# Patient Record
Sex: MALE | Race: WHITE | NOT HISPANIC OR LATINO | Employment: FULL TIME | URBAN - METROPOLITAN AREA
[De-identification: names, ages, dates, MRNs, and addresses within clinical notes are randomized per-mention and may not be internally consistent; named-entity substitution may affect disease eponyms.]

---

## 2025-05-23 ENCOUNTER — APPOINTMENT (OUTPATIENT)
Dept: PRIMARY CARE | Facility: CLINIC | Age: 40
End: 2025-05-23

## 2025-05-23 VITALS
HEART RATE: 83 BPM | TEMPERATURE: 97.8 F | HEIGHT: 72 IN | RESPIRATION RATE: 16 BRPM | DIASTOLIC BLOOD PRESSURE: 82 MMHG | SYSTOLIC BLOOD PRESSURE: 125 MMHG | OXYGEN SATURATION: 100 % | WEIGHT: 192.6 LBS | BODY MASS INDEX: 26.09 KG/M2

## 2025-05-23 DIAGNOSIS — L70.9 ACNE, UNSPECIFIED ACNE TYPE: ICD-10-CM

## 2025-05-23 DIAGNOSIS — Z00.00 HEALTHCARE MAINTENANCE: Primary | ICD-10-CM

## 2025-05-23 DIAGNOSIS — I34.0 MITRAL VALVE INSUFFICIENCY, UNSPECIFIED ETIOLOGY: ICD-10-CM

## 2025-05-23 DIAGNOSIS — N52.9 ERECTILE DYSFUNCTION, UNSPECIFIED ERECTILE DYSFUNCTION TYPE: ICD-10-CM

## 2025-05-23 DIAGNOSIS — L72.3 SEBACEOUS CYST: ICD-10-CM

## 2025-05-23 DIAGNOSIS — R53.83 OTHER FATIGUE: ICD-10-CM

## 2025-05-23 PROBLEM — I34.1 MITRAL VALVE PROLAPSE: Status: ACTIVE | Noted: 2025-05-23

## 2025-05-23 PROBLEM — G47.33 OSA (OBSTRUCTIVE SLEEP APNEA): Status: ACTIVE | Noted: 2025-05-23

## 2025-05-23 PROCEDURE — 1036F TOBACCO NON-USER: CPT | Performed by: FAMILY MEDICINE

## 2025-05-23 PROCEDURE — 99396 PREV VISIT EST AGE 40-64: CPT | Performed by: FAMILY MEDICINE

## 2025-05-23 PROCEDURE — 3008F BODY MASS INDEX DOCD: CPT | Performed by: FAMILY MEDICINE

## 2025-05-23 RX ORDER — TADALAFIL 10 MG/1
10 TABLET ORAL DAILY PRN
Qty: 12 TABLET | Refills: 3 | Status: SHIPPED | OUTPATIENT
Start: 2025-05-23 | End: 2026-05-23

## 2025-05-23 ASSESSMENT — ENCOUNTER SYMPTOMS
POLYDIPSIA: 0
CONFUSION: 0
FEVER: 0
HEMATURIA: 0
JOINT SWELLING: 0
COUGH: 0
VOMITING: 0
MYALGIAS: 0
SORE THROAT: 0
HALLUCINATIONS: 0
NAUSEA: 0
CONSTIPATION: 0
CHILLS: 0
FATIGUE: 1
PALPITATIONS: 0
DIFFICULTY URINATING: 0
EYE DISCHARGE: 0
ADENOPATHY: 0
DIARRHEA: 0
SINUS PRESSURE: 0
AGITATION: 0
ABDOMINAL PAIN: 0
DIZZINESS: 0
WEAKNESS: 0
BRUISES/BLEEDS EASILY: 0
ABDOMINAL DISTENTION: 0
SHORTNESS OF BREATH: 0
SEIZURES: 0

## 2025-05-23 ASSESSMENT — PATIENT HEALTH QUESTIONNAIRE - PHQ9
SUM OF ALL RESPONSES TO PHQ9 QUESTIONS 1 AND 2: 0
2. FEELING DOWN, DEPRESSED OR HOPELESS: NOT AT ALL
1. LITTLE INTEREST OR PLEASURE IN DOING THINGS: NOT AT ALL

## 2025-05-23 NOTE — ASSESSMENT & PLAN NOTE
Orders:    tadalafil (Cialis) 10 mg tablet; Take 1 tablet (10 mg) by mouth once daily as needed for erectile dysfunction.    Testosterone, total and free; Future

## 2025-05-23 NOTE — PROGRESS NOTES
Subjective   Patient ID: Royer Acosta is a 39 y.o. male who presents for Establish Care and Annual Exam.  Well Adult Physical   Patient here for a comprehensive physical exam.The patient reports problems -patient was seen by cardiology at the [facility] about 2-1/2 years ago and was advised to follow-up with another cardiology regarding a procedure for his mitral valve prolapse however he did not follow-up with that physician and has been lost to follow-up since.  He denies any chest pain palpitations syncope or exertional fatigue.      Patient does have longstanding erectile dysfunction he has had some medication either Cialis or Viagra in the past which seem to help he would like to try this again.  However he also endorses decreased libido and decreased energy overall.    Patient also has noticed significant acne on his back sometimes this is very uncomfortable when he is wearing his vest for work.  He also has an enlarging cyst on his back.  This cyst is not particularly painful or sore right now    Diet: Well-balanced    Exercise: m lifting weights once a week for 20 to 30 minutes    Social History    Tobacco Use      Smoking status: Former        Packs/day: 0.00        Types: Cigarettes        Quit date: 2010        Years since quitting: 15.4      Smokeless tobacco: Never    Alcohol use: Not Currently    Drug use: Never        There is no immunization history on file for this patient.                 Review of Systems   Constitutional:  Positive for fatigue. Negative for chills and fever.   HENT:  Negative for ear pain, sinus pressure and sore throat.    Eyes:  Negative for discharge and visual disturbance.   Respiratory:  Negative for cough and shortness of breath.    Cardiovascular:  Negative for chest pain and palpitations.   Gastrointestinal:  Negative for abdominal distention, abdominal pain, constipation, diarrhea, nausea and vomiting.   Endocrine: Negative for cold intolerance, heat  intolerance, polydipsia and polyuria.   Genitourinary:  Negative for difficulty urinating, hematuria and urgency.   Musculoskeletal:  Negative for joint swelling and myalgias.   Skin:  Negative for rash.   Allergic/Immunologic: Negative for environmental allergies and immunocompromised state.   Neurological:  Negative for dizziness, seizures and weakness.   Hematological:  Negative for adenopathy. Does not bruise/bleed easily.   Psychiatric/Behavioral:  Negative for agitation, confusion and hallucinations.        Objective   Physical Exam  Constitutional:       Appearance: Normal appearance.   HENT:      Head: Normocephalic and atraumatic.      Right Ear: Tympanic membrane normal.      Left Ear: Tympanic membrane normal.      Nose: Nose normal.      Mouth/Throat:      Mouth: Mucous membranes are moist.      Pharynx: Oropharynx is clear.   Cardiovascular:      Rate and Rhythm: Normal rate and regular rhythm.      Heart sounds: Murmur (3 out of 6 systolic murmur) heard.   Pulmonary:      Effort: Pulmonary effort is normal.      Breath sounds: Normal breath sounds.   Abdominal:      General: Abdomen is flat. Bowel sounds are normal.      Palpations: Abdomen is soft.   Skin:     General: Skin is warm and dry.      Comments: Mild to moderate cystic acne over lower face and upper back with scarring.  Sebaceous cyst not inflamed over left mid back approximately 2 cm in diameter.     Neurological:      Mental Status: He is alert.   Psychiatric:         Mood and Affect: Mood normal.         Behavior: Behavior normal.         Assessment & Plan  Healthcare maintenance    Orders:    Lipid Panel; Future    TSH; Future    HIV 1/2 Antigen/Antibody Screen with Reflex to Confirmation; Future    Hepatitis C antibody; Future    CBC and Auto Differential; Future    Comprehensive Metabolic Panel; Future    Other fatigue    Orders:    Referral to General Surgery; Future    TSH; Future    CBC and Auto Differential; Future    Comprehensive  Metabolic Panel; Future    Erectile dysfunction, unspecified erectile dysfunction type    Orders:    tadalafil (Cialis) 10 mg tablet; Take 1 tablet (10 mg) by mouth once daily as needed for erectile dysfunction.    Testosterone, total and free; Future    Acne, unspecified acne type         Sebaceous cyst    Orders:    Referral to General Surgery; Future

## 2025-05-25 LAB
ALBUMIN SERPL-MCNC: 4.6 G/DL (ref 3.6–5.1)
ALP SERPL-CCNC: 61 U/L (ref 36–130)
ALT SERPL-CCNC: 22 U/L (ref 9–46)
ANION GAP SERPL CALCULATED.4IONS-SCNC: 8 MMOL/L (CALC) (ref 7–17)
AST SERPL-CCNC: 16 U/L (ref 10–40)
BASOPHILS # BLD AUTO: 71 CELLS/UL (ref 0–200)
BASOPHILS NFR BLD AUTO: 1 %
BILIRUB SERPL-MCNC: 0.8 MG/DL (ref 0.2–1.2)
BUN SERPL-MCNC: 15 MG/DL (ref 7–25)
CALCIUM SERPL-MCNC: 9.6 MG/DL (ref 8.6–10.3)
CHLORIDE SERPL-SCNC: 100 MMOL/L (ref 98–110)
CHOLEST SERPL-MCNC: 231 MG/DL
CHOLEST/HDLC SERPL: 5.8 (CALC)
CO2 SERPL-SCNC: 30 MMOL/L (ref 20–32)
CREAT SERPL-MCNC: 1.08 MG/DL (ref 0.6–1.29)
EGFRCR SERPLBLD CKD-EPI 2021: 88 ML/MIN/1.73M2
EOSINOPHIL # BLD AUTO: 298 CELLS/UL (ref 15–500)
EOSINOPHIL NFR BLD AUTO: 4.2 %
ERYTHROCYTE [DISTWIDTH] IN BLOOD BY AUTOMATED COUNT: 13 % (ref 11–15)
GLUCOSE SERPL-MCNC: 92 MG/DL (ref 65–99)
HCT VFR BLD AUTO: 46.7 % (ref 38.5–50)
HCV AB SERPL QL IA: NORMAL
HDLC SERPL-MCNC: 40 MG/DL
HGB BLD-MCNC: 15.4 G/DL (ref 13.2–17.1)
HIV 1+2 AB+HIV1 P24 AG SERPL QL IA: NORMAL
LDLC SERPL CALC-MCNC: 143 MG/DL (CALC)
LYMPHOCYTES # BLD AUTO: 1960 CELLS/UL (ref 850–3900)
LYMPHOCYTES NFR BLD AUTO: 27.6 %
MCH RBC QN AUTO: 29 PG (ref 27–33)
MCHC RBC AUTO-ENTMCNC: 33 G/DL (ref 32–36)
MCV RBC AUTO: 87.9 FL (ref 80–100)
MONOCYTES # BLD AUTO: 433 CELLS/UL (ref 200–950)
MONOCYTES NFR BLD AUTO: 6.1 %
NEUTROPHILS # BLD AUTO: 4338 CELLS/UL (ref 1500–7800)
NEUTROPHILS NFR BLD AUTO: 61.1 %
NONHDLC SERPL-MCNC: 191 MG/DL (CALC)
PLATELET # BLD AUTO: 215 THOUSAND/UL (ref 140–400)
PMV BLD REES-ECKER: 10.7 FL (ref 7.5–12.5)
POTASSIUM SERPL-SCNC: 4.5 MMOL/L (ref 3.5–5.3)
PROT SERPL-MCNC: 7.1 G/DL (ref 6.1–8.1)
RBC # BLD AUTO: 5.31 MILLION/UL (ref 4.2–5.8)
SODIUM SERPL-SCNC: 138 MMOL/L (ref 135–146)
TESTOST FREE SERPL-MCNC: NORMAL PG/ML
TESTOST SERPL-MCNC: NORMAL NG/DL
TRIGL SERPL-MCNC: 290 MG/DL
TSH SERPL-ACNC: 1.16 MIU/L (ref 0.4–4.5)
WBC # BLD AUTO: 7.1 THOUSAND/UL (ref 3.8–10.8)

## 2025-05-26 ENCOUNTER — PATIENT MESSAGE (OUTPATIENT)
Dept: PRIMARY CARE | Facility: CLINIC | Age: 40
End: 2025-05-26
Payer: COMMERCIAL

## 2025-05-28 ENCOUNTER — TELEPHONE (OUTPATIENT)
Dept: PRIMARY CARE | Facility: CLINIC | Age: 40
End: 2025-05-28

## 2025-05-28 ENCOUNTER — TELEPHONE (OUTPATIENT)
Dept: PRIMARY CARE | Facility: CLINIC | Age: 40
End: 2025-05-28
Payer: COMMERCIAL

## 2025-05-28 NOTE — TELEPHONE ENCOUNTER
Patient called, needs order for Echo, faxed to [facility], [facility], fax#[phone], he can have the test done tomorrow at 11:00, needs to be stat.

## 2025-05-28 NOTE — TELEPHONE ENCOUNTER
Patient called to schedule an appointment for a ECHO, in order to have it done in the time frame your requesting, the order needs to be changed to stat, for the insurance to cover it.

## 2025-05-29 LAB
ALBUMIN SERPL-MCNC: 4.6 G/DL (ref 3.6–5.1)
ALP SERPL-CCNC: 61 U/L (ref 36–130)
ALT SERPL-CCNC: 22 U/L (ref 9–46)
ANION GAP SERPL CALCULATED.4IONS-SCNC: 8 MMOL/L (CALC) (ref 7–17)
AST SERPL-CCNC: 16 U/L (ref 10–40)
BASOPHILS # BLD AUTO: 71 CELLS/UL (ref 0–200)
BASOPHILS NFR BLD AUTO: 1 %
BILIRUB SERPL-MCNC: 0.8 MG/DL (ref 0.2–1.2)
BUN SERPL-MCNC: 15 MG/DL (ref 7–25)
CALCIUM SERPL-MCNC: 9.6 MG/DL (ref 8.6–10.3)
CHLORIDE SERPL-SCNC: 100 MMOL/L (ref 98–110)
CHOLEST SERPL-MCNC: 231 MG/DL
CHOLEST/HDLC SERPL: 5.8 (CALC)
CO2 SERPL-SCNC: 30 MMOL/L (ref 20–32)
CREAT SERPL-MCNC: 1.08 MG/DL (ref 0.6–1.29)
EGFRCR SERPLBLD CKD-EPI 2021: 88 ML/MIN/1.73M2
EOSINOPHIL # BLD AUTO: 298 CELLS/UL (ref 15–500)
EOSINOPHIL NFR BLD AUTO: 4.2 %
ERYTHROCYTE [DISTWIDTH] IN BLOOD BY AUTOMATED COUNT: 13 % (ref 11–15)
GLUCOSE SERPL-MCNC: 92 MG/DL (ref 65–99)
HCT VFR BLD AUTO: 46.7 % (ref 38.5–50)
HCV AB SERPL QL IA: NORMAL
HDLC SERPL-MCNC: 40 MG/DL
HGB BLD-MCNC: 15.4 G/DL (ref 13.2–17.1)
HIV 1+2 AB+HIV1 P24 AG SERPL QL IA: NORMAL
LDLC SERPL CALC-MCNC: 143 MG/DL (CALC)
LYMPHOCYTES # BLD AUTO: 1960 CELLS/UL (ref 850–3900)
LYMPHOCYTES NFR BLD AUTO: 27.6 %
MCH RBC QN AUTO: 29 PG (ref 27–33)
MCHC RBC AUTO-ENTMCNC: 33 G/DL (ref 32–36)
MCV RBC AUTO: 87.9 FL (ref 80–100)
MONOCYTES # BLD AUTO: 433 CELLS/UL (ref 200–950)
MONOCYTES NFR BLD AUTO: 6.1 %
NEUTROPHILS # BLD AUTO: 4338 CELLS/UL (ref 1500–7800)
NEUTROPHILS NFR BLD AUTO: 61.1 %
NONHDLC SERPL-MCNC: 191 MG/DL (CALC)
PLATELET # BLD AUTO: 215 THOUSAND/UL (ref 140–400)
PMV BLD REES-ECKER: 10.7 FL (ref 7.5–12.5)
POTASSIUM SERPL-SCNC: 4.5 MMOL/L (ref 3.5–5.3)
PROT SERPL-MCNC: 7.1 G/DL (ref 6.1–8.1)
RBC # BLD AUTO: 5.31 MILLION/UL (ref 4.2–5.8)
SODIUM SERPL-SCNC: 138 MMOL/L (ref 135–146)
TESTOST FREE SERPL-MCNC: 67.7 PG/ML (ref 35–155)
TESTOST SERPL-MCNC: 358 NG/DL (ref 250–1100)
TRIGL SERPL-MCNC: 290 MG/DL
TSH SERPL-ACNC: 1.16 MIU/L (ref 0.4–4.5)
WBC # BLD AUTO: 7.1 THOUSAND/UL (ref 3.8–10.8)

## 2025-05-30 ENCOUNTER — APPOINTMENT (OUTPATIENT)
Dept: CARDIOLOGY | Facility: HOSPITAL | Age: 40
End: 2025-05-30
Payer: COMMERCIAL

## 2025-06-03 ENCOUNTER — APPOINTMENT (OUTPATIENT)
Dept: CARDIOLOGY | Facility: CLINIC | Age: 40
End: 2025-06-03
Payer: COMMERCIAL

## 2025-06-03 ENCOUNTER — OFFICE VISIT (OUTPATIENT)
Dept: CARDIOLOGY | Facility: CLINIC | Age: 40
End: 2025-06-03
Payer: COMMERCIAL

## 2025-06-03 ENCOUNTER — TELEPHONE (OUTPATIENT)
Dept: CARDIOLOGY | Facility: CLINIC | Age: 40
End: 2025-06-03

## 2025-06-03 VITALS
SYSTOLIC BLOOD PRESSURE: 114 MMHG | HEIGHT: 72 IN | WEIGHT: 194 LBS | HEART RATE: 76 BPM | BODY MASS INDEX: 26.28 KG/M2 | DIASTOLIC BLOOD PRESSURE: 54 MMHG

## 2025-06-03 DIAGNOSIS — R06.02 SHORTNESS OF BREATH: ICD-10-CM

## 2025-06-03 DIAGNOSIS — R00.2 PALPITATIONS: ICD-10-CM

## 2025-06-03 DIAGNOSIS — I34.0 MITRAL VALVE INSUFFICIENCY, UNSPECIFIED ETIOLOGY: Primary | ICD-10-CM

## 2025-06-03 DIAGNOSIS — E78.5 HYPERLIPIDEMIA, UNSPECIFIED HYPERLIPIDEMIA TYPE: ICD-10-CM

## 2025-06-03 PROCEDURE — 3008F BODY MASS INDEX DOCD: CPT | Performed by: INTERNAL MEDICINE

## 2025-06-03 PROCEDURE — 99205 OFFICE O/P NEW HI 60 MIN: CPT | Performed by: INTERNAL MEDICINE

## 2025-06-03 PROCEDURE — 93000 ELECTROCARDIOGRAM COMPLETE: CPT | Performed by: INTERNAL MEDICINE

## 2025-06-03 NOTE — PROGRESS NOTES
Patient:  Royer Acosta  YOB: 1985  MRN: [id number]       Impression/Plan:     Diagnoses and all orders for this visit:  Mitral valve insufficiency, unspecified etiology  -    Exam and echo are both consistent with severe mitral regurgitation although he has an excellent functional capacity able to run 1.5 mile.  Stress test would be very important to assure his functional capacity is indeed normal for age and that there are no inducible dysrhythmia associated.  -     Coronary angiography was said to be normal will obtain images to assess also obtain images of echo to assure LV systolic function truly normal there is suggestion of mild LV dilatation.  Given his functional capacity no indication for urgent intervention on the mitral valve.  Further recommendations based on results of stress testing also personal review of images when available.  -     The murmur sounds more like mitral regurgitation from valve dysfunction as opposed to valve prolapse hence would like to further evaluate the images  -     Stress Test; Future  Shortness of breath  -     Stress test to determine if significant exertional hypertension, dysrhythmia contributing.  He is known to have normal coronary arteries monitoring to determine if any dysrhythmia contributing  -     Stress Test; Future  -     Holter or Event Cardiac Monitor; Future  Palpitations  -    Mitral valve prolapse is associated with supraventricular and ventricular dysrhythmia monitoring to determine etiology of his symptoms  -     Holter or Event Cardiac Monitor; Future  Hyperlipidemia, unspecified hyperlipidemia type  -    LDL in excess of 140 he has been changing his diet quite a bit would consider statin therapy if after 3 to 6 months of optimizing diet LDL remains excessive      Chief Complaint/Active Symptoms:      Chief Complaint   Patient presents with    New Patient Visit     Presents today for NPV       Royer Acosta is a 40 y.o. male who presents  with moderate-severe MR at [facility] 2022 at that time also noted to have normal LV systolic function and normal coronary arteries.    He is an active duty  in [place].  Previously having served 20 years in [employer].  He is in need of stress testing to meet physical requirements for the [employer] and seeks cardiology opinion as to he is degree of mitral regurgitation.  As noted below on testing at [facility]  coronary arteries are angiographically normal there is a PFO and severe MR secondary to posterior leaflet flail.  At that time LV function was felt to be normal and no intervention was recommended at that point in time.    He remains quite active though he is more tired than he used to be.  He does not have shortness of breath.  Indeed last week he ran 1.5 mile without difficulty.  He has had no PND or orthopnea.  Occasionally he will feel a skipped heartbeat but no exertional tachycardia is appreciated.  He has had no dizziness, lightheadedness or syncope.    There is a family history of mitral valve prolapse in his sister.  No known history of premature coronary disease though his father  at a relatively young age from suicide.    Lab work 2025 CMP, CBC, hepatitis antibody, TSH normal cholesterol 231 HDL 40 triglycerides 290     10/11/2022 [facility]  Normal aorta  Normal aortic trileaflet valve  Large atrial septal aneurysm  PFO  Left to R shunt across PFO indicating high left atrial appendage pressures from MR  Severe MR, coanda Jet hitting the lateral anterior wall of Left atrial chamber  Posterior mitral leaflet with flail scallop  not clear if P2 or P3  Normal Left ventricle  Normal tricuspid valve  Normal pulmonic valve  Normal pulmonary artery  Coronary sinus appeared somewhat dilated  Normal IVC  No thrombus in appendage  Agitated saline jet no bubble crossign R to L due to L to R flow and high left atrial pressures    10/12/2022 right/left  heart catheterization and coronary angiography [facility]  HEMODYNAMICS:       Right Atrium: 1 mm hg       Right Ventricle: 28/3       Pulmonary Artery: 20/3/11       PCWP: 6 mm hg   Normal 02 sat step up no evidence of left-to-right shunting.  Coronary/LV        1.    Mitral valve regurgitation  2.    Normal coronary arteries  3.    Normal LV systolic function         2025 echocardiogram [facility]  - The left ventricle is mildly dilated. There is moderate eccentric left   ventricular hypertrophy. Left ventricular systolic function is normal. EF = 56 ±   5% (2D biplane) Left ventricular diastolic function was not evaluated due to >2+   MR.   - The right ventricle is normal in size. Right ventricular systolic function is   normal. Estimated right ventricular systolic pressure is not reported due to an   insufficient tricuspid regurgitation signal.   - The left atrial cavity is severely dilated. PFO seen on [facility] 10/11/2022. The   interatrial septum is aneurysmal.   - There is severe (4+) mitral valve regurgitation due to prolapse of the posterior    mitral leaflet. There appears to be a distal flail segment in some views (clips   #[id number], 55).   - Exam was compared with the prior  echocardiographic exam performed on   10/11/2022, there are no significant changes.     EC/3/25 NSR Normal record    Past medical history   Mitral regurgitation secondary to prolapse     Erectile dysfunction  Acne  Sleep apnea on CPAP    Surgical History[1]  Coronary angiography    Family History  Problem Relation Name Age of Onset    Drug abuse Mother  61    Alcohol abuse Father      COPD Father      Heart disease Father      Mitral valve prolapse Sister      Suicide Attempts Maternal Grandfather         Social hx  Stop smoking cigarettes , alcohol rare, drug abuse none      Review of Systems: Unremarkable except as noted above    Meds     Current Outpatient Medications   Medication Instructions     tadalafil (CIALIS) 10 mg, oral, Daily PRN        Allergies   Allergies[2]      Annotated Problems     Specialty Problems          Cardiology Problems    Mitral valve regurgitation    Diagnosed by the [facility] in 2022, patient lost to follow-up             Problem List     Patient Active Problem List    Diagnosis Date Noted    KATHRYN (obstructive sleep apnea) 05/23/2025    Sebaceous cyst 05/23/2025    Erectile dysfunction 05/23/2025    Acne 05/23/2025    Mitral valve regurgitation 05/23/2025       Objective:     Vitals:    06/03/25 0918   BP: 114/54   BP Location: Left arm   Patient Position: Sitting   Pulse: 76   Weight: 88 kg (194 lb)   Height: 1.829 m (6')      Wt Readings from Last 4 Encounters:   06/03/25 88 kg (194 lb)   05/23/25 87.4 kg (192 lb 9.6 oz)           LAB:     Lab Results   Component Value Date    WBC 7.1 05/24/2025    HGB 15.4 05/24/2025    HCT 46.7 05/24/2025     05/24/2025    CHOL 231 (H) 05/24/2025    TRIG 290 (H) 05/24/2025    HDL 40 05/24/2025    ALT 22 05/24/2025    AST 16 05/24/2025     05/24/2025    K 4.5 05/24/2025     05/24/2025    CREATININE 1.08 05/24/2025    BUN 15 05/24/2025    CO2 30 05/24/2025    TSH 1.16 05/24/2025         Physical Exam     General Appearance: alert and oriented to person, place and time, in no acute distress  Cardiovascular: normal rate, regular rhythm, normal S1 and S2, 3/6 mitral regurgitation murmur no click appreciated, no rubs, clicks, or gallops,  no JVD  Pulmonary/Chest: clear to auscultation bilaterally- no wheezes, rales or rhonchi, normal air movement, no respiratory distress  Abdomen: soft, non-tender, non-distended, normal bowel sounds, no masses   Extremities: no cyanosis, clubbing or edema  Skin: warm and dry, no rash or erythema  Eyes: EOMI  Neck: supple and non-tender without mass, no thyromegaly   Neurological: alert, oriented, normal speech, no focal findings or movement disorder noted  Vascular: Pulses 2+ no  villa    Scribe Attestation  By signing my name below, I, Senait Dalton CMA   , Scribe   attest that this documentation has been prepared under the direction and in the presence of Zeeshan Mercedes MD.    Provider attestation-scribe documentation  Any medical record entries made by the scribe were at my discretion and personally dictated by me.  I have reviewed the chart and agree that the record accurately reflects my personal performance of the history, physical exam, discussion and plan.                 [1]   Past Surgical History:  Procedure Laterality Date    CARDIAC CATHETERIZATION  10/2022   [2]   Allergies  Allergen Reactions    Tetracyclines Itching    Ragweed Pollen Itching

## 2025-06-03 NOTE — PATIENT INSTRUCTIONS
IN PREPARATION FOR YOUR MONITOR APPOINTMENT BATHE OR SHOWER PRIOR TO YOUR APPOINTMENT.  DO NOT APPLY ANY THING TO YOUR SKIN-  NO LOTION, CREAMS, OILS, POWDERS, PERFUMES, COLOGNES OR SPRAY FRAGRANCES.      HAVE REGULAR DENTAL CHECK UPS TO AVOID INFECTIONS THAT CAN GO TO YOUR HEART VALVE     Please bring all medicines, vitamins, and herbal supplements with you in original bottles to every appointment! This is the best way to ensure your medication list in your chart is accurate.    Prescriptions will not be filled unless you are compliant with your follow up appointments or have a follow up appointment scheduled as per instruction of your physician. Refills should be requested at the time of your visit.    DID YOU KNOW  We have a pharmacy here in the [facility].  They can fill all prescriptions, not just cardiac medications.  Prescriptions from other pharmacies can easily be transferred to the  pharmacy by the  pharmacist on site.   pharmacies offer FREE HOME DELIVERY on medications to anywhere in [place]. They can sync your medications. Typically prescriptions can be ready in 10 - 15 minutes. If pharmacy is unable to fill your  prescription or if cost is more than your paying now the Pharmacist can easily transfer back to your Pharmacy of choice. Pharmacy phone # [phone].

## 2025-06-03 NOTE — TELEPHONE ENCOUNTER
14 day Aaron [id number] does not require auth per Jace DUNLAP at [facility]-call reference#[id number].

## 2025-06-05 ENCOUNTER — APPOINTMENT (OUTPATIENT)
Dept: CARDIOLOGY | Facility: CLINIC | Age: 40
End: 2025-06-05

## 2025-06-12 ENCOUNTER — APPOINTMENT (OUTPATIENT)
Dept: SURGERY | Facility: CLINIC | Age: 40
End: 2025-06-12
Payer: COMMERCIAL

## 2025-06-12 VITALS
BODY MASS INDEX: 26.03 KG/M2 | WEIGHT: 192.2 LBS | RESPIRATION RATE: 16 BRPM | OXYGEN SATURATION: 97 % | HEART RATE: 67 BPM | DIASTOLIC BLOOD PRESSURE: 74 MMHG | TEMPERATURE: 97.5 F | SYSTOLIC BLOOD PRESSURE: 124 MMHG | HEIGHT: 72 IN

## 2025-06-12 DIAGNOSIS — R22.2 MASS OF SUBCUTANEOUS TISSUE OF BACK: Primary | ICD-10-CM

## 2025-06-12 DIAGNOSIS — R53.83 OTHER FATIGUE: ICD-10-CM

## 2025-06-12 DIAGNOSIS — L72.3 SEBACEOUS CYST: ICD-10-CM

## 2025-06-12 PROCEDURE — 1036F TOBACCO NON-USER: CPT | Performed by: SURGERY

## 2025-06-12 PROCEDURE — 3008F BODY MASS INDEX DOCD: CPT | Performed by: SURGERY

## 2025-06-12 PROCEDURE — 99204 OFFICE O/P NEW MOD 45 MIN: CPT | Performed by: SURGERY

## 2025-06-12 NOTE — PROGRESS NOTES
History and Physical        Referring Provider: Karrie Barragan MD       Chief Complaint:  Mass on L lower Back      History of Present Illness:  This is a 40 y.o. male who presents with mass to L lower back   Present for years, not sure when he first noticed it   Thinks that it is getting bigger - now is aware of it in his vest at work which is bothersome  Unsure if it has had drainage  No other similar lesions in past    Past Medical History:  Mitral Valve regurgitation- currently undergoing work up, ED, KATHRYN, Hyperlipidemia, cluster headaches  6/17 - stress testing upcoming, holter monitor     Past Surgical History:  Heart cath 2022    Medications:  As per patient medical records     Allergies:  Tetracyclines, Ragweed      Family History:  The information was reviewed and no pertinent findings are relevant to the presenting problem  Mother passed away when younger, father medical hx unknown - limited family hx     Social History:  Patient works as a  and lives at home with wife.   Son in college   Denies nicotine use, EtOH use, or IDU.    Review of Systems  A complete 10 point review of systems was performed and is negative except as noted in the history of present illness.      Physical Exam: /74 (BP Location: Left arm, Patient Position: Sitting, BP Cuff Size: Adult)   Pulse 67   Temp 36.4 °C (97.5 °F) (Temporal)   Resp 16   Ht 1.829 m (6')   Wt 87.2 kg (192 lb 3.2 oz)   SpO2 97%   BMI 26.07 kg/m²      General: No acute distress.  Neuro: Alert and oriented ×3. Follows commands.  Head: Atraumatic  Eyes: Extraocular motions intact.  Ears: Hears normal speaking voice.  Chest: Nonlabored breathing, bilateral chest rise.  Musculoskeletal: Moves all extremities.  Normal range of motion.  Skin: No rashes, 2.5 cm oval firm mobile mass to L lower back, non painful to palpation, telangectasias present over top.  Psychological: Normal affect        Labs:  Labs from 5/24/25 reviewed and are within  normal limits for the patient.        Imaging: none          Assessment:  This is a 40 y.o. male who presents with lipoma to left lower back. Patient desires removal    We discussed the surgery, recovery, and restrictions afterwards and all questions were answered.     The reasons for, benefits to, and risks of the operation were discussed.  The risks include, but are not limited to, bleeding, infection, recurrence.  The patient appeared to understand and consented for the operation. Anticipated convalescence was reviewed in detail. All questions were answered, and consent was obtained.      Plan:  -- We will plan for excision of L lower back  lipoma. On 7/29, with follow-up on 8/11   -- We will plan for surgery to be performed as an outpatient.  -- We will apply Dermabond, so the patient may shower the day after surgery.  No swimming or tub soaks for 4 weeks post-operatively.      Lanie Mathews DO  Family Medicine - PGY-1    Patient seen, discussed, and examined with resident.   I personally oversaw all the critical portions of the exam.  I reviewed the resident's documentation. All changes have been made within the note to reflect my medical decision making.    Shameka Jordan MD MPH  General Surgery  Office: [phone]  Fax: [phone]

## 2025-06-17 ENCOUNTER — HOSPITAL ENCOUNTER (OUTPATIENT)
Dept: CARDIOLOGY | Facility: CLINIC | Age: 40
Discharge: HOME | End: 2025-06-17
Payer: COMMERCIAL

## 2025-06-17 ENCOUNTER — APPOINTMENT (OUTPATIENT)
Dept: CARDIOLOGY | Facility: CLINIC | Age: 40
End: 2025-06-17
Payer: COMMERCIAL

## 2025-06-17 DIAGNOSIS — I34.0 MITRAL VALVE INSUFFICIENCY, UNSPECIFIED ETIOLOGY: ICD-10-CM

## 2025-06-17 DIAGNOSIS — R00.2 PALPITATIONS: ICD-10-CM

## 2025-06-17 DIAGNOSIS — R06.02 SHORTNESS OF BREATH: ICD-10-CM

## 2025-06-17 PROCEDURE — 93017 CV STRESS TEST TRACING ONLY: CPT

## 2025-06-17 PROCEDURE — 93018 CV STRESS TEST I&R ONLY: CPT | Performed by: INTERNAL MEDICINE

## 2025-06-17 PROCEDURE — 93016 CV STRESS TEST SUPVJ ONLY: CPT | Performed by: INTERNAL MEDICINE

## 2025-07-10 PROCEDURE — 93248 EXT ECG>7D<15D REV&INTERPJ: CPT | Performed by: INTERNAL MEDICINE

## 2025-07-14 ENCOUNTER — APPOINTMENT (OUTPATIENT)
Dept: CARDIOLOGY | Facility: CLINIC | Age: 40
End: 2025-07-14
Payer: COMMERCIAL

## 2025-07-14 VITALS
BODY MASS INDEX: 25.87 KG/M2 | DIASTOLIC BLOOD PRESSURE: 70 MMHG | SYSTOLIC BLOOD PRESSURE: 116 MMHG | HEIGHT: 72 IN | WEIGHT: 191 LBS | HEART RATE: 77 BPM

## 2025-07-14 DIAGNOSIS — I34.1 MITRAL VALVE PROLAPSE: ICD-10-CM

## 2025-07-14 DIAGNOSIS — I47.10 PAROXYSMAL SVT (SUPRAVENTRICULAR TACHYCARDIA): Primary | ICD-10-CM

## 2025-07-14 DIAGNOSIS — I34.0 NONRHEUMATIC MITRAL VALVE REGURGITATION: ICD-10-CM

## 2025-07-14 DIAGNOSIS — E78.00 PURE HYPERCHOLESTEROLEMIA: ICD-10-CM

## 2025-07-14 PROCEDURE — 1036F TOBACCO NON-USER: CPT | Performed by: INTERNAL MEDICINE

## 2025-07-14 PROCEDURE — 99214 OFFICE O/P EST MOD 30 MIN: CPT | Performed by: INTERNAL MEDICINE

## 2025-07-14 PROCEDURE — 3008F BODY MASS INDEX DOCD: CPT | Performed by: INTERNAL MEDICINE

## 2025-07-14 NOTE — PATIENT INSTRUCTIONS
FOLLOW UP WITH Dr. Zeeshan Mercedes MD, [facility] IN 6 MONTHS    FASTING LABS TO BE DONE ONE WEEK PRIOR TO NEXT VISIT    ECHO TO BE SCHEDULED JUST PRIOR TO NEXT APPT     CARDIAC MRI TO BE SCHEDULED IN THE NEAR FUTURE         DID YOU KNOW  We have a pharmacy here in the [facility].  They can fill all prescriptions, not just cardiac medications.  Prescriptions from other pharmacies can easily be transferred to the  pharmacy by the  pharmacist on site.   pharmacies offer FREE HOME DELIVERY on medications to anywhere in [place]. They can sync your medications. Typically prescriptions can be ready in 10 - 15 minutes. If pharmacy is unable to fill your  prescription or if cost is more than your paying now the Pharmacist can easily transfer back to your Pharmacy of choice. Pharmacy phone # [phone].     Please bring all medicines, vitamins, and herbal supplements with you in original bottles to every appointment! This is the best way to ensure your medication list in your chart is accurate.    Prescriptions will not be filled unless you are compliant with your follow up appointments or have a follow up appointment scheduled as per instruction of your physician. Refills should be requested at the time of your visit.

## 2025-07-14 NOTE — PROGRESS NOTES
Patient:  Royer Acosta  YOB: 1985  MRN: [id number]       Impression/Plan:     Diagnoses and all orders for this visit:  Paroxysmal SVT (supraventricular tachycardia)  -     Currently asymptomatic.  -     Should symptoms occur could consider verapamil or beta-blocker.  He would prefer no medication there is no absolute indication at this time.  He was able accomplish a remarkable functional capacity for 40 years old at 16 METS without exertional dysrhythmia.  Continue an expectant approach  Nonrheumatic mitral valve regurgitation  Mitral valve prolapse  -     High level of activity without exertional dyspnea.  LV on echo of May 2025 may be slightly enlarged comparison to 2022 but still within normal range.  Cardiac MRI would be the optimal assessment of LV function to monitor closely over time for any early LV dilatation.  -     At this time there is no indication for intervention on the mitral valve.  He has no shortness of breath able to accomplish 16 METS of activity without dysrhythmia or cardiovascular symptom.  Continue to monitor for symptoms.  -      Plan to repeat echo in 6 months versus repeat MRI depending on findings of MRI.  -     MR cardiac morphology and function w and wo IV contrast; Future  -     Transthoracic Echo Complete; Future  -     Lipid Panel; Future  -     He has no cardiac issues at this time to prevent him from working as a   Hyperlipidemia        -     He will watch his diet more closely and before I see him in 6 months we will obtain a lipid profile        Chief Complaint/Active Symptoms:      Chief Complaint   Patient presents with    Results     Patient presents to the office to discuss recent testing.        Royer Acosta is a 40 y.o. male who presents with  moderate-severe MR at [facility] October 2022 at that time also noted to have normal LV systolic function and normal coronary arteries.     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   Previously having served 20 years in [employer].  He is in need of stress testing to meet physical requirements for the [employer] and seeks cardiology opinion as to he is degree of mitral regurgitation.  As noted below on testing at [facility] 2022 coronary arteries are angiographically normal there is a PFO and severe MR secondary to posterior leaflet flail.  At that time LV function was felt to be normal and no intervention was recommended at that point in time.     10/12/2022 right/left heart catheterization and coronary angiography [facility]  HEMODYNAMICS:       Right Atrium: 1 mm hg       Right Ventricle: 28/3       Pulmonary Artery: 20/3/11       PCWP: 6 mm hg   Normal 02 sat step up no evidence of left-to-right shunting.  Coronary/LV        1.    Mitral valve regurgitation  2.    Normal coronary arteries  3.    Normal LV systolic function        5/29/2025 echocardiogram [facility]  - The left ventricle is mildly dilated. There is moderate eccentric left   ventricular hypertrophy. Left ventricular systolic function is normal. EF = 56 ±   5% (2D biplane) Left ventricular diastolic function was not evaluated due to >2+   MR.   - The right ventricle is normal in size. Right ventricular systolic function is   normal. Estimated right ventricular systolic pressure is not reported due to an   insufficient tricuspid regurgitation signal.   - The left atrial cavity is severely dilated. PFO seen on [facility] 10/11/2022. The   interatrial septum is aneurysmal.   - There is severe (4+) mitral valve regurgitation due to prolapse of the posterior    mitral leaflet. There appears to be a distal flail segment in some views (clips   #37-40, 55).   - Exam was compared with the prior  echocardiographic exam performed on   10/11/2022, there are no significant changes.     6/17/2025 exercise stress test  16.2 METS.  Normal heart rate blood pressure response  No evidence of inducible  ischemia.    6/17/2025 Holter monitor  Four episodes of atrial tachycardia maximum duration 21 seconds with heart rate ranging 97 to 150 bpm.  No sustained dysrhythmia no bradycardia no ventricular ectopy no AV block    Last seen him 6/3/2025 done at that time as new patient as he is in need of [facility] and opinion as to might regurgitation.  Described some tiredness compared to what he is used to be able to do.  Though just prior to that he had run 1.5 miles without difficulty.  Exam and echo are consistent with severe MR with excellent functional capacity.  Her stress test was ordered as well.  Monitoring also as he describes some palpitations which can be seen with mitral valve prolapse.  Ricardo all was high he was adjusting his diet.    I have reviewed echocardiograms from 2022 as well as 2025 at [facility].  2025 LV systolic function is normal at 56% as noted in the report perhaps slightly more dilated compared to the study of 2022.  There is significant prolapse posterior leaflet with 3-4+ MR.  There is marked intra atrial septal aneurysm which bows extensively into the right atrium.  Some of this is worsened because the eccentric mitral regurgitation jet is directed right at the midportion of the interatrial septum coronary angiography reviewed and clearly normal.    He denies angina, dyspnea, palpitation, edema, lightheadedness or syncope.  He has had no symptoms of claudication or neurologic deterioration.  There have been no hospitalizations or emergency room visits since last office visit.    He says he has been feeling very well.  His stress test as noted above was done at [facility] and shows no inducible ischemia at a high functional capacity.  He tells me the [place] [employer] wants to do it on their own and they have ordered an exercise stress test to be done at [facility] as well.  They do have access to his current result.  He does note that  sometimes his heart rate takes a while to return to normal when he is exerting himself.  The episodes on his monitor where heart rate was in the 190s was during a very strenuous period of time he had no symptoms of palpitation at that time.  Hands would appear to be normal sinus tachycardia in the setting of high level of activity.  His stress test showed a normal heart rate response exercise slightly slowed recovery.  He has had no chest pain or shortness of breath at a high level of activity.  No fever chills or sweats.  He was unaware of the episodes of SVT on the Holter.               Review of Systems: Unremarkable except as noted above    Meds     Current Outpatient Medications   Medication Instructions    tadalafil (CIALIS) 10 mg, oral, Daily PRN        Allergies   Allergies[1]      Annotated Problems     Specialty Problems          Cardiology Problems    Mitral valve regurgitation    Diagnosed by the [facility] in 2022, patient lost to follow-up         Hyperlipidemia    Palpitations        Problem List     Patient Active Problem List    Diagnosis Date Noted    Shortness of breath 06/03/2025    Palpitations 06/03/2025    Hyperlipidemia 06/03/2025    KATHRYN (obstructive sleep apnea) 05/23/2025    Sebaceous cyst 05/23/2025    Erectile dysfunction 05/23/2025    Acne 05/23/2025    Mitral valve regurgitation 05/23/2025    Mass of subcutaneous tissue of back 06/12/2025       Objective:     Vitals:    07/14/25 0901   BP: 116/70   BP Location: Right arm   Patient Position: Sitting   Pulse: 77   Weight: 86.6 kg (191 lb)   Height: 1.829 m (6')      Wt Readings from Last 4 Encounters:   07/14/25 86.6 kg (191 lb)   06/12/25 87.2 kg (192 lb 3.2 oz)   06/03/25 88 kg (194 lb)   05/23/25 87.4 kg (192 lb 9.6 oz)           LAB:     Lab Results   Component Value Date    WBC 7.1 05/24/2025    HGB 15.4 05/24/2025    HCT 46.7 05/24/2025     05/24/2025    CHOL 231 (H) 05/24/2025    TRIG 290 (H) 05/24/2025    HDL 40  05/24/2025    ALT 22 05/24/2025    AST 16 05/24/2025     05/24/2025    K 4.5 05/24/2025     05/24/2025    CREATININE 1.08 05/24/2025    BUN 15 05/24/2025    CO2 30 05/24/2025    TSH 1.16 05/24/2025         Physical Exam     General Appearance: alert and oriented to person, place and time, in no acute distress  Cardiovascular: normal rate, regular rhythm, normal S1 and S2, no murmurs, rubs, clicks, or gallops,  no JVD  Pulmonary/Chest: clear to auscultation bilaterally- no wheezes, rales or rhonchi, normal air movement, no respiratory distress  Abdomen: soft, non-tender, non-distended, normal bowel sounds, no masses   Extremities: no cyanosis, clubbing or edema  Skin: warm and dry, no rash or erythema  Eyes: EOMI  Neck: supple and non-tender without mass, no thyromegaly   Neurological: alert, oriented, normal speech, no focal findings or movement disorder noted  Vascular: Pulses 2+    Scribe Attestation  By signing my name below, I, Jovita Luna RN, Scribe   attest that this documentation has been prepared under the direction and in the presence of Zeeshan Mercedes MD.        Provider attestation-scribe documentation  Any medical record entries made by the scribe were at my discretion and personally dictated by me.  I have reviewed the chart and agree that the record accurately reflects my personal performance of the history, physical exam, discussion and plan.                 [1]   Allergies  Allergen Reactions    Tetracyclines Itching    Ragweed Pollen Itching

## 2025-07-17 ENCOUNTER — HOSPITAL ENCOUNTER (OUTPATIENT)
Dept: RADIOLOGY | Facility: HOSPITAL | Age: 40
Discharge: HOME | End: 2025-07-17

## 2025-07-17 ENCOUNTER — HOSPITAL ENCOUNTER (OUTPATIENT)
Dept: CARDIOLOGY | Facility: CLINIC | Age: 40
Discharge: HOME | End: 2025-07-17

## 2025-07-17 DIAGNOSIS — Z00.00 ENCOUNTER FOR GENERAL ADULT MEDICAL EXAMINATION WITHOUT ABNORMAL FINDINGS: ICD-10-CM

## 2025-07-17 PROCEDURE — 93017 CV STRESS TEST TRACING ONLY: CPT

## 2025-07-17 PROCEDURE — 71046 X-RAY EXAM CHEST 2 VIEWS: CPT

## 2025-07-24 ENCOUNTER — APPOINTMENT (OUTPATIENT)
Dept: PRIMARY CARE | Facility: CLINIC | Age: 40
End: 2025-07-24
Payer: COMMERCIAL

## 2025-07-29 ENCOUNTER — ANESTHESIA (OUTPATIENT)
Dept: OPERATING ROOM | Facility: CLINIC | Age: 40
End: 2025-07-29
Payer: COMMERCIAL

## 2025-07-29 ENCOUNTER — HOSPITAL ENCOUNTER (OUTPATIENT)
Facility: CLINIC | Age: 40
Setting detail: OUTPATIENT SURGERY
Discharge: HOME | End: 2025-07-29
Attending: SURGERY | Admitting: SURGERY
Payer: COMMERCIAL

## 2025-07-29 ENCOUNTER — ANESTHESIA EVENT (OUTPATIENT)
Dept: OPERATING ROOM | Facility: CLINIC | Age: 40
End: 2025-07-29
Payer: COMMERCIAL

## 2025-07-29 VITALS
HEIGHT: 72 IN | WEIGHT: 188.05 LBS | TEMPERATURE: 96.8 F | DIASTOLIC BLOOD PRESSURE: 65 MMHG | SYSTOLIC BLOOD PRESSURE: 112 MMHG | BODY MASS INDEX: 25.47 KG/M2 | RESPIRATION RATE: 16 BRPM | OXYGEN SATURATION: 96 % | HEART RATE: 69 BPM

## 2025-07-29 DIAGNOSIS — R22.2 MASS OF SUBCUTANEOUS TISSUE OF BACK: Primary | ICD-10-CM

## 2025-07-29 PROCEDURE — 0752T DGTZ GLS MCRSCP SLD LVL III: CPT | Mod: TC,SUR,ELYLAB | Performed by: SURGERY

## 2025-07-29 PROCEDURE — 2500000004 HC RX 250 GENERAL PHARMACY W/ HCPCS (ALT 636 FOR OP/ED)

## 2025-07-29 PROCEDURE — 3700000001 HC GENERAL ANESTHESIA TIME - INITIAL BASE CHARGE: Performed by: SURGERY

## 2025-07-29 PROCEDURE — 3600000008 HC OR TIME - EACH INCREMENTAL 1 MINUTE - PROCEDURE LEVEL THREE: Performed by: SURGERY

## 2025-07-29 PROCEDURE — 2500000005 HC RX 250 GENERAL PHARMACY W/O HCPCS: Performed by: SURGERY

## 2025-07-29 PROCEDURE — 2720000007 HC OR 272 NO HCPCS: Performed by: SURGERY

## 2025-07-29 PROCEDURE — 2500000004 HC RX 250 GENERAL PHARMACY W/ HCPCS (ALT 636 FOR OP/ED): Performed by: SURGERY

## 2025-07-29 PROCEDURE — 21932 EXC BACK TUM DEEP < 5 CM: CPT | Performed by: SURGERY

## 2025-07-29 PROCEDURE — 13101 CMPLX RPR TRUNK 2.6-7.5 CM: CPT | Performed by: SURGERY

## 2025-07-29 PROCEDURE — 7100000009 HC PHASE TWO TIME - INITIAL BASE CHARGE: Performed by: SURGERY

## 2025-07-29 PROCEDURE — A21932 PR EXC TUMOR SOFT TISS BACK/FLANK SUBFASCIAL <5CM: Performed by: NURSE ANESTHETIST, CERTIFIED REGISTERED

## 2025-07-29 PROCEDURE — 99024 POSTOP FOLLOW-UP VISIT: CPT | Performed by: SURGERY

## 2025-07-29 PROCEDURE — 7100000010 HC PHASE TWO TIME - EACH INCREMENTAL 1 MINUTE: Performed by: SURGERY

## 2025-07-29 PROCEDURE — 3600000003 HC OR TIME - INITIAL BASE CHARGE - PROCEDURE LEVEL THREE: Performed by: SURGERY

## 2025-07-29 PROCEDURE — 3700000002 HC GENERAL ANESTHESIA TIME - EACH INCREMENTAL 1 MINUTE: Performed by: SURGERY

## 2025-07-29 PROCEDURE — A21932 PR EXC TUMOR SOFT TISS BACK/FLANK SUBFASCIAL <5CM: Performed by: STUDENT IN AN ORGANIZED HEALTH CARE EDUCATION/TRAINING PROGRAM

## 2025-07-29 PROCEDURE — 2500000004 HC RX 250 GENERAL PHARMACY W/ HCPCS (ALT 636 FOR OP/ED): Mod: JZ

## 2025-07-29 RX ORDER — FENTANYL CITRATE 50 UG/ML
INJECTION, SOLUTION INTRAMUSCULAR; INTRAVENOUS AS NEEDED
Status: DISCONTINUED | OUTPATIENT
Start: 2025-07-29 | End: 2025-07-29

## 2025-07-29 RX ORDER — LIDOCAINE HYDROCHLORIDE 10 MG/ML
0.1 INJECTION, SOLUTION EPIDURAL; INFILTRATION; INTRACAUDAL; PERINEURAL ONCE
Status: DISCONTINUED | OUTPATIENT
Start: 2025-07-29 | End: 2025-07-29 | Stop reason: HOSPADM

## 2025-07-29 RX ORDER — LIDOCAINE HYDROCHLORIDE 20 MG/ML
INJECTION, SOLUTION EPIDURAL; INFILTRATION; INTRACAUDAL; PERINEURAL AS NEEDED
Status: DISCONTINUED | OUTPATIENT
Start: 2025-07-29 | End: 2025-07-29

## 2025-07-29 RX ORDER — SODIUM CHLORIDE, SODIUM LACTATE, POTASSIUM CHLORIDE, CALCIUM CHLORIDE 600; 310; 30; 20 MG/100ML; MG/100ML; MG/100ML; MG/100ML
INJECTION, SOLUTION INTRAVENOUS CONTINUOUS PRN
Status: DISCONTINUED | OUTPATIENT
Start: 2025-07-29 | End: 2025-07-29

## 2025-07-29 RX ORDER — HYDROMORPHONE HYDROCHLORIDE 1 MG/ML
0.4 INJECTION, SOLUTION INTRAMUSCULAR; INTRAVENOUS; SUBCUTANEOUS EVERY 5 MIN PRN
Status: DISCONTINUED | OUTPATIENT
Start: 2025-07-29 | End: 2025-07-29 | Stop reason: HOSPADM

## 2025-07-29 RX ORDER — MEPERIDINE HYDROCHLORIDE 50 MG/ML
12.5 INJECTION INTRAMUSCULAR; INTRAVENOUS; SUBCUTANEOUS EVERY 10 MIN PRN
Status: DISCONTINUED | OUTPATIENT
Start: 2025-07-29 | End: 2025-07-29 | Stop reason: HOSPADM

## 2025-07-29 RX ORDER — MIDAZOLAM HYDROCHLORIDE 1 MG/ML
INJECTION, SOLUTION INTRAMUSCULAR; INTRAVENOUS AS NEEDED
Status: DISCONTINUED | OUTPATIENT
Start: 2025-07-29 | End: 2025-07-29

## 2025-07-29 RX ORDER — HYDRALAZINE HYDROCHLORIDE 20 MG/ML
5 INJECTION INTRAMUSCULAR; INTRAVENOUS EVERY 30 MIN PRN
Status: DISCONTINUED | OUTPATIENT
Start: 2025-07-29 | End: 2025-07-29 | Stop reason: HOSPADM

## 2025-07-29 RX ORDER — DIPHENHYDRAMINE HYDROCHLORIDE 50 MG/ML
12.5 INJECTION, SOLUTION INTRAMUSCULAR; INTRAVENOUS ONCE AS NEEDED
Status: DISCONTINUED | OUTPATIENT
Start: 2025-07-29 | End: 2025-07-29 | Stop reason: HOSPADM

## 2025-07-29 RX ORDER — OXYCODONE HYDROCHLORIDE 5 MG/1
10 TABLET ORAL EVERY 4 HOURS PRN
Status: DISCONTINUED | OUTPATIENT
Start: 2025-07-29 | End: 2025-07-29 | Stop reason: HOSPADM

## 2025-07-29 RX ORDER — LIDOCAINE HYDROCHLORIDE 20 MG/ML
INJECTION, SOLUTION INFILTRATION; PERINEURAL AS NEEDED
Status: DISCONTINUED | OUTPATIENT
Start: 2025-07-29 | End: 2025-07-29 | Stop reason: HOSPADM

## 2025-07-29 RX ORDER — SODIUM CHLORIDE 0.9 G/100ML
INJECTION, SOLUTION IRRIGATION AS NEEDED
Status: DISCONTINUED | OUTPATIENT
Start: 2025-07-29 | End: 2025-07-29 | Stop reason: HOSPADM

## 2025-07-29 RX ORDER — OXYCODONE HYDROCHLORIDE 5 MG/1
5 TABLET ORAL EVERY 4 HOURS PRN
Status: DISCONTINUED | OUTPATIENT
Start: 2025-07-29 | End: 2025-07-29 | Stop reason: HOSPADM

## 2025-07-29 RX ORDER — HYDROMORPHONE HYDROCHLORIDE 1 MG/ML
0.2 INJECTION, SOLUTION INTRAMUSCULAR; INTRAVENOUS; SUBCUTANEOUS EVERY 5 MIN PRN
Status: DISCONTINUED | OUTPATIENT
Start: 2025-07-29 | End: 2025-07-29 | Stop reason: HOSPADM

## 2025-07-29 RX ORDER — ALBUTEROL SULFATE 0.83 MG/ML
2.5 SOLUTION RESPIRATORY (INHALATION) ONCE AS NEEDED
Status: DISCONTINUED | OUTPATIENT
Start: 2025-07-29 | End: 2025-07-29 | Stop reason: HOSPADM

## 2025-07-29 RX ORDER — PROPOFOL 10 MG/ML
INJECTION, EMULSION INTRAVENOUS CONTINUOUS PRN
Status: DISCONTINUED | OUTPATIENT
Start: 2025-07-29 | End: 2025-07-29

## 2025-07-29 RX ORDER — SODIUM CHLORIDE, SODIUM LACTATE, POTASSIUM CHLORIDE, CALCIUM CHLORIDE 600; 310; 30; 20 MG/100ML; MG/100ML; MG/100ML; MG/100ML
100 INJECTION, SOLUTION INTRAVENOUS CONTINUOUS
Status: DISCONTINUED | OUTPATIENT
Start: 2025-07-29 | End: 2025-07-29 | Stop reason: HOSPADM

## 2025-07-29 RX ORDER — ONDANSETRON HYDROCHLORIDE 2 MG/ML
4 INJECTION, SOLUTION INTRAVENOUS ONCE AS NEEDED
Status: DISCONTINUED | OUTPATIENT
Start: 2025-07-29 | End: 2025-07-29 | Stop reason: HOSPADM

## 2025-07-29 RX ORDER — GLYCOPYRROLATE 0.6MG/3ML
SYRINGE (ML) INTRAVENOUS AS NEEDED
Status: DISCONTINUED | OUTPATIENT
Start: 2025-07-29 | End: 2025-07-29

## 2025-07-29 RX ORDER — DROPERIDOL 2.5 MG/ML
0.62 INJECTION, SOLUTION INTRAMUSCULAR; INTRAVENOUS ONCE AS NEEDED
Status: DISCONTINUED | OUTPATIENT
Start: 2025-07-29 | End: 2025-07-29 | Stop reason: HOSPADM

## 2025-07-29 RX ORDER — BUPIVACAINE HYDROCHLORIDE AND EPINEPHRINE 5; 5 MG/ML; UG/ML
INJECTION, SOLUTION EPIDURAL; INTRACAUDAL; PERINEURAL AS NEEDED
Status: DISCONTINUED | OUTPATIENT
Start: 2025-07-29 | End: 2025-07-29 | Stop reason: HOSPADM

## 2025-07-29 RX ORDER — LABETALOL HYDROCHLORIDE 5 MG/ML
5 INJECTION, SOLUTION INTRAVENOUS ONCE AS NEEDED
Status: DISCONTINUED | OUTPATIENT
Start: 2025-07-29 | End: 2025-07-29 | Stop reason: HOSPADM

## 2025-07-29 RX ADMIN — MIDAZOLAM 2 MG: 1 INJECTION INTRAMUSCULAR; INTRAVENOUS at 12:26

## 2025-07-29 RX ADMIN — FENTANYL CITRATE 50 MCG: 50 INJECTION, SOLUTION INTRAMUSCULAR; INTRAVENOUS at 12:31

## 2025-07-29 RX ADMIN — SODIUM CHLORIDE, POTASSIUM CHLORIDE, SODIUM LACTATE AND CALCIUM CHLORIDE: 600; 310; 30; 20 INJECTION, SOLUTION INTRAVENOUS at 12:26

## 2025-07-29 RX ADMIN — LIDOCAINE HYDROCHLORIDE 100 MG: 20 INJECTION, SOLUTION EPIDURAL; INFILTRATION; INTRACAUDAL; PERINEURAL at 12:29

## 2025-07-29 RX ADMIN — PROPOFOL 200 MCG/KG/MIN: 10 INJECTION, EMULSION INTRAVENOUS at 12:30

## 2025-07-29 RX ADMIN — Medication 0.1 MG: at 12:26

## 2025-07-29 ASSESSMENT — PAIN SCALES - GENERAL
PAINLEVEL_OUTOF10: 0 - NO PAIN

## 2025-07-29 ASSESSMENT — COLUMBIA-SUICIDE SEVERITY RATING SCALE - C-SSRS
6. HAVE YOU EVER DONE ANYTHING, STARTED TO DO ANYTHING, OR PREPARED TO DO ANYTHING TO END YOUR LIFE?: NO
1. IN THE PAST MONTH, HAVE YOU WISHED YOU WERE DEAD OR WISHED YOU COULD GO TO SLEEP AND NOT WAKE UP?: NO
2. HAVE YOU ACTUALLY HAD ANY THOUGHTS OF KILLING YOURSELF?: NO

## 2025-07-29 ASSESSMENT — PAIN - FUNCTIONAL ASSESSMENT
PAIN_FUNCTIONAL_ASSESSMENT: 0-10

## 2025-07-29 NOTE — ANESTHESIA POSTPROCEDURE EVALUATION
Patient: Royer Acosta    Procedure Summary       Date: 07/29/25 Room / Location: [facility] OR 02 / Virtual [facility]    Anesthesia Start: 1226 Anesthesia Stop: 1306    Procedure: EXCISION, LESION, TORSO Diagnosis:       Mass of subcutaneous tissue of back      (Mass of subcutaneous tissue of back [R22.2])    Surgeons: Shameka Jordan MD Responsible Provider: Annika Glaser MD    Anesthesia Type: MAC ASA Status: 2            Anesthesia Type: MAC    Vitals Value Taken Time   /64 07/29/25 13:20   Temp 36 °C (96.8 °F) 07/29/25 13:05   Pulse 62 07/29/25 13:20   Resp 16 07/29/25 13:20   SpO2 95 % 07/29/25 13:05       Anesthesia Post Evaluation    Patient location during evaluation: PACU  Patient participation: complete - patient participated  Level of consciousness: awake and alert  Pain management: adequate  Airway patency: patent  Cardiovascular status: acceptable  Respiratory status: acceptable  Hydration status: acceptable  Postoperative Nausea and Vomiting: none        There were no known notable events for this encounter.

## 2025-07-29 NOTE — H&P
History and Physical        Referring Provider: Karrie Barragan MD        Chief Complaint:  Mass on L lower Back      History of Present Illness:  This is a 40 y.o. male who presents with mass to L lower back   Present for years, not sure when he first noticed it   Thinks that it is getting bigger - now is aware of it in his vest at work which is bothersome  Unsure if it has had drainage  No other similar lesions in past     Past Medical History:  Mitral Valve regurgitation- currently undergoing work up, ED, KATHRYN, Hyperlipidemia, cluster headaches  6/17 - stress testing upcoming, holter monitor      Past Surgical History:  Heart cath 2022     Medications:  As per patient medical records     Allergies:  Tetracyclines, Ragweed      Family History:  The information was reviewed and no pertinent findings are relevant to the presenting problem  Mother passed away when younger, father medical hx unknown - limited family hx     Social History:  Patient works as a  and lives at home with wife.   Son in college   Denies nicotine use, EtOH use, or IDU.     Review of Systems  A complete 10 point review of systems was performed and is negative except as noted in the history of present illness.        Physical Exam:   /71   Pulse 68   Temp 36 °C (96.8 °F) (Temporal)   Resp 16   Ht 1.829 m (6')   Wt 85.3 kg (188 lb 0.8 oz)   SpO2 99%   BMI 25.50 kg/m²      General: No acute distress.  Neuro: Alert and oriented ×3. Follows commands.  Head: Atraumatic  Eyes: Extraocular motions intact.  Ears: Hears normal speaking voice.  Chest: Nonlabored breathing, bilateral chest rise.  Musculoskeletal: Moves all extremities.  Normal range of motion.  Skin: No rashes, 2.5 cm oval firm mobile mass to L lower back, non painful to palpation, telangectasias present over top.  Psychological: Normal affect        Labs:  Labs from 5/24/25 reviewed and are within normal limits for the patient.        Imaging: none           Assessment:  This is a 40 y.o. male who presents with lipoma to left lower back. Patient desires removal     We discussed the surgery, recovery, and restrictions afterwards and all questions were answered.      The reasons for, benefits to, and risks of the operation were discussed.  The risks include, but are not limited to, bleeding, infection, recurrence.  The patient appeared to understand and consented for the operation. Anticipated convalescence was reviewed in detail. All questions were answered, and consent was obtained.      Plan:  -- We will plan for excision of L lower back  lipoma. On 7/29, with follow-up on 8/11   -- We will plan for surgery to be performed as an outpatient.  -- We will apply Dermabond, so the patient may shower the day after surgery.  No swimming or tub soaks for 4 weeks post-operatively.       Shameka Jordan MD MPH  General Surgery  Office: [phone]  Fax: [phone]

## 2025-07-29 NOTE — OP NOTE
EXCISION, LESION, TORSO Operative Note     Date: 2025  OR Location: [facility]    Name: Royer Acosta, : 1985, Age: 40 y.o., MRN: [id number], Sex: male    Diagnosis  Pre-op Diagnosis      * Mass of subcutaneous tissue of back [R22.2] Post-op Diagnosis     * Mass of subcutaneous tissue of back [R22.2]     Procedures  EXCISION, LESION, TORSO  21197 - DC EXC TUMOR SOFT TISS BACK/FLANK SUBFASCIAL <5CM      Surgeons      * Shameka Jordan - Primary    Resident/Fellow/Other Assistant:  Surgeons and Role:  * No surgeons found with a matching role *    Staff:   Surgical Assistant:   Kikiulator: Liliana Ronquillo Person: Carmen    Anesthesia Staff: Anesthesiologist: Annika Glaser MD  CRNA: ANDRE Granger-CRNA  SRNA: Lissette Garcia    Procedure Summary  Anesthesia: Monitor Anesthesia Care  ASA: II  Estimated Blood Loss: 1mL  Intra-op Medications:   Administrations occurring from 1223 to 1323 on 25:   Medication Name Total Dose   sodium chloride 0.9 % irrigation solution 1,000 mL   BUPivacaine-EPINEPHrine (PF) (Marcaine w/EPI) 0.5 %-1:200,000 injection 10 mL   lidocaine (Xylocaine) 20 mg/mL (2 %) injection 10 mL   fentaNYL (Sublimaze) injection 50 mcg/mL 50 mcg   glycopyrrolate (Robinul) 0.2 mg/mL injection SYRINGE 0.1 mg   LR infusion Cannot be calculated   lidocaine PF (Xylocaine-MPF)  2 % 100 mg   midazolam (Versed) injection 1 mg/mL 2 mg   propofol (Diprivan) injection 10 mg/mL 203.44 mg              Anesthesia Record               Intraprocedure I/O Totals          Intake    LR infusion 250.00 mL    Total Intake 250 mL       Output    Est. Blood Loss 3 mL    Total Output 3 mL       Net    Net Volume 247 mL          Specimen:   ID Type Source Tests Collected by Time   1 : LEFT BACK SEBACEOUS CYST Tissue SKIN CYST SURGICAL PATHOLOGY EXAM Shameka Jordan MD 2025 1237                 Drains and/or Catheters: * None in log *    Tourniquet Times:         Implants:     Findings:  4cm sebaceous cyst    Indications: Royer Acosta is an 40 y.o. male who is having surgery for Mass of subcutaneous tissue of back [R22.2].     The reasons for, benefits to, and risks of the operation were discussed. The risks include, but are not limited to, bleeding, infection, recurrence. The patient appeared to understand and consented for the operation.       DESCRIPTION OF PROCEDURE:   The patient was taken to the operating room and transferred to the operating room table in right lateral decubitus position, secured, and padded at pressure points. A time-out had been performed per protocol. The patient's mid back was prepped, and draped sterilely. Local anesthetic was instilled over the mass and surrounding tissue.  A vertical, elliptical incision following lobo's lines ~6 cm was made to excise the mass completely including pore.  Sharp and blunt dissection was used to remove the entire sebaceous cyst and wall, measuring about 4 cm. Electrocautery was used to obtain hemostasis. Once removed, the wound was irrigated  with saline. Hemostasis was again checked and maintained.  Then, 2% Lidocaine with 0.5% Marcaine with epi was instilled throughout the cavity, in the fascia and subcutaneus layers. The wound was closed in layers using interrupted 2-0 Vicryl to approximate the deep tissue and obliterate the dead space.  I next used a running 3-0 Vicryl deep dermal stitches to approximate the subcutaneous tissue and then a 4-0 Monocryl running suture to close skin. Dermabond was applied overtop of the incision. Instrument, sponge, and needle counts were correct x2. I was present throughout the entire procedure.  The patient was taken to  the recovery room in stable condition.       Shameka Jordan MD MPH   General Surgery   [phone]   Office: [phone]   Fax: [phone]    Evidence of Infection: No   Complications:  None; patient tolerated the procedure well.    Disposition: PACU - hemodynamically  stable.  Condition: stable         Task Performed by RNFA or Surgical Assistant:  Retraction      Additional Details: may shower tomorrow    Attending Attestation: I was present and scrubbed for the entire procedure.    Shameka Jordan  Phone Number: [phone]

## 2025-07-29 NOTE — DISCHARGE INSTRUCTIONS
1. Any questions regarding your surgery or procedure are always welcomed at my office, [phone]. Any questions about caring for the wound, convalescence, nausea, pain medication, intestinal function, or any other aspect of surgery should be directed to my office. I will provide any prescriptions for pain medication, antibiotics or any other procedure related needs. If there is skin glue over incisions, it will fall off in 1-2 weeks. If there is dressing you can remove the wound dressing on the second day after the surgery. After removing the dressing, the wound doesn't need to be recovered. Again, the wound may be open to the air. If keeping the wound covered makes you more comfortable, then the dressing should be changed daily. Antibiotic creams or ointments are not required. If you feel that Neosporin or similar product would be helpful, then it is okay to use them. If there is any question about the potential of an infection, then call my office. You can get the wound wet when the dressing is removed. A short shower or bath for the first week is allowed. I wouldn't soak in a hot tub or take a greater than 10 minute bath for the first week.  2. Any questions about your regular medications for blood pressure, diabetes, heart or breathing problems, cholesterol, arthritis or other baseline health issue, should be directed to your family doctor. I cannot provide prescription refills for these medications.  I will always try to review medications with you so there is no confusion.   3. In the long term, you can eat whatever you like. However for the first 24 hours after anesthesia, you should eat lightly. Two or three small meals is preferred over one big meal. A loss of appetite or even nausea is extremely common after any anesthesia. Avoid the Thanksgiving feast, pepperoni pizza and spicy foods for those first day.  Pain pills, inactivity and convalescence can lead to constipation. I suggest taking Metamucil or  other bulk fiber product to avoid this. Constipation should resolve as you get back to regular eating, regular activity and your regular schedule. If the Metamucil doesn't work and several days pass without a bowel movement, then a small dose of Milk of Magnesia (1-2 tablespoons) can be tried. Be careful about taking a second dose of MOM; you can rebound with significant loose stools.  4. I need to see you in the office at your scheduled follow up. At that time, I will check the wounds, and make arrangements for any other needs.  5. Any paperwork relevant to the surgery such as FMLA, disability, insurance, estimates for return to work, etc., should be brought to my office to be filled out at the post-operative visit.  6. For the first several days, I would encourage you to take the pain medicine regularly. You can take over the counter Tylenol and Ibuprofen (Motrin). The combination of the Tylenol /Motrin works much better than either alone.  7. If you have chest pain or shortness of breath go the emergency room. If there are questions about the wound, pain medication, swelling or redness, call the office first. Most issues can be solved without an ER visit. Call [phone].

## 2025-07-29 NOTE — ANESTHESIA PREPROCEDURE EVALUATION
Patient: Royer Acosta    Procedure Information       Date/Time: 07/29/25 1223    Procedure: EXCISION, LESION, TORSO    Location: [facility] 02 / Virtual [facility]    Surgeons: Shameka Jordan MD            Relevant Problems   Cardiac   (+) Hyperlipidemia   (+) Mitral valve prolapse   (+) Mitral valve regurgitation   (+) Paroxysmal SVT (supraventricular tachycardia)      Pulmonary   (+) KATHRYN (obstructive sleep apnea)     Seen by cardiology -- no interventions required at this time. Symptomatic management.     5/29/2025 echocardiogram [facility]  - The left ventricle is mildly dilated. There is moderate eccentric left   ventricular hypertrophy. Left ventricular systolic function is normal. EF = 56 ±   5% (2D biplane) Left ventricular diastolic function was not evaluated due to >2+   MR.   - The right ventricle is normal in size. Right ventricular systolic function is   normal. Estimated right ventricular systolic pressure is not reported due to an   insufficient tricuspid regurgitation signal.   - The left atrial cavity is severely dilated. PFO seen on [facility] 10/11/2022. The   interatrial septum is aneurysmal.   - There is severe (4+) mitral valve regurgitation due to prolapse of the posterior    mitral leaflet. There appears to be a distal flail segment in some views (clips   #37-40, 55).   - Exam was compared with the prior  echocardiographic exam performed on   10/11/2022, there are no significant changes.      6/17/2025 exercise stress test  16.2 METS.  Normal heart rate blood pressure response  No evidence of inducible ischemia.    Clinical information reviewed:   Tobacco  Allergies  Meds   Med Hx  Surg Hx   Fam Hx  Soc Hx        NPO Detail:  NPO/Void Status  Date of Last Liquid: 07/29/25  Time of Last Liquid: 1000 (sip of water)  Date of Last Solid: 07/29/25  Time of Last Solid: 0000         Physical Exam    Airway  Mallampati: II  TM distance: >3 FB  Neck ROM: full  Mouth opening: 3  or more finger widths     Cardiovascular - normal exam  (+) murmur     Dental - normal exam     Pulmonary - normal exam   Abdominal - normal exam           Anesthesia Plan    History of general anesthesia?: yes  History of complications of general anesthesia?: no    ASA 2     MAC     intravenous induction   Trial extubation is planned.  Anesthetic plan and risks discussed with patient.    Plan discussed with CAA and attending.

## 2025-07-30 ENCOUNTER — TELEPHONE (OUTPATIENT)
Dept: SURGERY | Facility: CLINIC | Age: 40
End: 2025-07-30

## 2025-07-30 ENCOUNTER — HOSPITAL ENCOUNTER (EMERGENCY)
Facility: HOSPITAL | Age: 40
Discharge: HOME | End: 2025-07-30
Attending: STUDENT IN AN ORGANIZED HEALTH CARE EDUCATION/TRAINING PROGRAM
Payer: COMMERCIAL

## 2025-07-30 ENCOUNTER — APPOINTMENT (OUTPATIENT)
Dept: RADIOLOGY | Facility: HOSPITAL | Age: 40
End: 2025-07-30
Payer: COMMERCIAL

## 2025-07-30 ENCOUNTER — OFFICE VISIT (OUTPATIENT)
Dept: SURGERY | Facility: CLINIC | Age: 40
End: 2025-07-30
Payer: COMMERCIAL

## 2025-07-30 VITALS
OXYGEN SATURATION: 98 % | BODY MASS INDEX: 25.47 KG/M2 | HEART RATE: 63 BPM | DIASTOLIC BLOOD PRESSURE: 61 MMHG | SYSTOLIC BLOOD PRESSURE: 125 MMHG | RESPIRATION RATE: 16 BRPM | TEMPERATURE: 98.2 F | HEIGHT: 72 IN | WEIGHT: 188 LBS

## 2025-07-30 VITALS
RESPIRATION RATE: 16 BRPM | OXYGEN SATURATION: 97 % | DIASTOLIC BLOOD PRESSURE: 78 MMHG | SYSTOLIC BLOOD PRESSURE: 125 MMHG | HEART RATE: 67 BPM

## 2025-07-30 DIAGNOSIS — G89.18 POST-OP PAIN: ICD-10-CM

## 2025-07-30 DIAGNOSIS — G89.18 POSTOPERATIVE PAIN: Primary | ICD-10-CM

## 2025-07-30 DIAGNOSIS — L72.3 SEBACEOUS CYST: Primary | ICD-10-CM

## 2025-07-30 DIAGNOSIS — T14.8XXA HEMATOMA: ICD-10-CM

## 2025-07-30 LAB
ALBUMIN SERPL BCP-MCNC: 4.2 G/DL (ref 3.4–5)
ALP SERPL-CCNC: 49 U/L (ref 33–120)
ALT SERPL W P-5'-P-CCNC: 11 U/L (ref 10–52)
ANION GAP SERPL CALC-SCNC: 12 MMOL/L (ref 10–20)
AST SERPL W P-5'-P-CCNC: 16 U/L (ref 9–39)
BASOPHILS # BLD AUTO: 0.08 X10*3/UL (ref 0–0.1)
BASOPHILS NFR BLD AUTO: 0.9 %
BILIRUB SERPL-MCNC: 0.4 MG/DL (ref 0–1.2)
BUN SERPL-MCNC: 18 MG/DL (ref 6–23)
CALCIUM SERPL-MCNC: 9 MG/DL (ref 8.6–10.3)
CHLORIDE SERPL-SCNC: 103 MMOL/L (ref 98–107)
CO2 SERPL-SCNC: 24 MMOL/L (ref 21–32)
CREAT SERPL-MCNC: 1.09 MG/DL (ref 0.5–1.3)
EGFRCR SERPLBLD CKD-EPI 2021: 87 ML/MIN/1.73M*2
EOSINOPHIL # BLD AUTO: 0.28 X10*3/UL (ref 0–0.7)
EOSINOPHIL NFR BLD AUTO: 3 %
ERYTHROCYTE [DISTWIDTH] IN BLOOD BY AUTOMATED COUNT: 12.6 % (ref 11.5–14.5)
GLUCOSE SERPL-MCNC: 88 MG/DL (ref 74–99)
HCT VFR BLD AUTO: 39.3 % (ref 41–52)
HGB BLD-MCNC: 13.8 G/DL (ref 13.5–17.5)
IMM GRANULOCYTES # BLD AUTO: 0.01 X10*3/UL (ref 0–0.7)
IMM GRANULOCYTES NFR BLD AUTO: 0.1 % (ref 0–0.9)
LYMPHOCYTES # BLD AUTO: 3.14 X10*3/UL (ref 1.2–4.8)
LYMPHOCYTES NFR BLD AUTO: 33.9 %
MCH RBC QN AUTO: 29.9 PG (ref 26–34)
MCHC RBC AUTO-ENTMCNC: 35.1 G/DL (ref 32–36)
MCV RBC AUTO: 85 FL (ref 80–100)
MONOCYTES # BLD AUTO: 0.55 X10*3/UL (ref 0.1–1)
MONOCYTES NFR BLD AUTO: 5.9 %
NEUTROPHILS # BLD AUTO: 5.19 X10*3/UL (ref 1.2–7.7)
NEUTROPHILS NFR BLD AUTO: 56.2 %
NRBC BLD-RTO: 0 /100 WBCS (ref 0–0)
PLATELET # BLD AUTO: 183 X10*3/UL (ref 150–450)
POTASSIUM SERPL-SCNC: 3.9 MMOL/L (ref 3.5–5.3)
PROT SERPL-MCNC: 6.5 G/DL (ref 6.4–8.2)
RBC # BLD AUTO: 4.61 X10*6/UL (ref 4.5–5.9)
SODIUM SERPL-SCNC: 135 MMOL/L (ref 136–145)
WBC # BLD AUTO: 9.3 X10*3/UL (ref 4.4–11.3)

## 2025-07-30 PROCEDURE — 85025 COMPLETE CBC W/AUTO DIFF WBC: CPT | Performed by: PHYSICIAN ASSISTANT

## 2025-07-30 PROCEDURE — 2500000001 HC RX 250 WO HCPCS SELF ADMINISTERED DRUGS (ALT 637 FOR MEDICARE OP): Performed by: PHYSICIAN ASSISTANT

## 2025-07-30 PROCEDURE — 80053 COMPREHEN METABOLIC PANEL: CPT | Performed by: PHYSICIAN ASSISTANT

## 2025-07-30 PROCEDURE — 36415 COLL VENOUS BLD VENIPUNCTURE: CPT | Performed by: PHYSICIAN ASSISTANT

## 2025-07-30 PROCEDURE — 96374 THER/PROPH/DIAG INJ IV PUSH: CPT | Mod: 59

## 2025-07-30 PROCEDURE — 96375 TX/PRO/DX INJ NEW DRUG ADDON: CPT | Mod: 59

## 2025-07-30 PROCEDURE — 2500000004 HC RX 250 GENERAL PHARMACY W/ HCPCS (ALT 636 FOR OP/ED): Performed by: PHYSICIAN ASSISTANT

## 2025-07-30 PROCEDURE — 71275 CT ANGIOGRAPHY CHEST: CPT | Mod: FOREIGN READ | Performed by: RADIOLOGY

## 2025-07-30 PROCEDURE — 99285 EMERGENCY DEPT VISIT HI MDM: CPT | Mod: 25 | Performed by: STUDENT IN AN ORGANIZED HEALTH CARE EDUCATION/TRAINING PROGRAM

## 2025-07-30 PROCEDURE — 2550000001 HC RX 255 CONTRASTS: Performed by: PHYSICIAN ASSISTANT

## 2025-07-30 PROCEDURE — 99024 POSTOP FOLLOW-UP VISIT: CPT | Performed by: SURGERY

## 2025-07-30 PROCEDURE — 71275 CT ANGIOGRAPHY CHEST: CPT

## 2025-07-30 RX ORDER — MORPHINE SULFATE 4 MG/ML
4 INJECTION, SOLUTION INTRAMUSCULAR; INTRAVENOUS ONCE
Status: COMPLETED | OUTPATIENT
Start: 2025-07-30 | End: 2025-07-30

## 2025-07-30 RX ORDER — OXYCODONE AND ACETAMINOPHEN 5; 325 MG/1; MG/1
1 TABLET ORAL EVERY 8 HOURS PRN
Qty: 6 TABLET | Refills: 0 | Status: SHIPPED | OUTPATIENT
Start: 2025-07-30 | End: 2025-08-01

## 2025-07-30 RX ORDER — ONDANSETRON HYDROCHLORIDE 2 MG/ML
4 INJECTION, SOLUTION INTRAVENOUS ONCE
Status: COMPLETED | OUTPATIENT
Start: 2025-07-30 | End: 2025-07-30

## 2025-07-30 RX ORDER — DOCUSATE SODIUM 100 MG/1
100 CAPSULE, LIQUID FILLED ORAL 2 TIMES DAILY
Qty: 30 CAPSULE | Refills: 0 | Status: SHIPPED | OUTPATIENT
Start: 2025-07-30 | End: 2025-08-13

## 2025-07-30 RX ORDER — OXYCODONE HYDROCHLORIDE 5 MG/1
5 TABLET ORAL EVERY 6 HOURS PRN
Qty: 5 TABLET | Refills: 0 | Status: SHIPPED | OUTPATIENT
Start: 2025-07-30

## 2025-07-30 RX ORDER — OXYCODONE HYDROCHLORIDE 5 MG/1
5 TABLET ORAL EVERY 6 HOURS PRN
Qty: 5 TABLET | Refills: 0 | Status: SHIPPED | OUTPATIENT
Start: 2025-07-30 | End: 2025-08-07 | Stop reason: SDUPTHER

## 2025-07-30 RX ORDER — OXYCODONE AND ACETAMINOPHEN 5; 325 MG/1; MG/1
1 TABLET ORAL ONCE
Refills: 0 | Status: COMPLETED | OUTPATIENT
Start: 2025-07-30 | End: 2025-07-30

## 2025-07-30 RX ADMIN — MORPHINE SULFATE 4 MG: 4 INJECTION, SOLUTION INTRAMUSCULAR; INTRAVENOUS at 02:05

## 2025-07-30 RX ADMIN — ONDANSETRON 4 MG: 2 INJECTION INTRAMUSCULAR; INTRAVENOUS at 02:05

## 2025-07-30 RX ADMIN — OXYCODONE HYDROCHLORIDE AND ACETAMINOPHEN 1 TABLET: 5; 325 TABLET ORAL at 04:19

## 2025-07-30 RX ADMIN — IOHEXOL 75 ML: 350 INJECTION, SOLUTION INTRAVENOUS at 03:54

## 2025-07-30 ASSESSMENT — PAIN SCALES - GENERAL
PAINLEVEL_OUTOF10: 3
PAINLEVEL_OUTOF10: 3
PAINLEVEL_OUTOF10: 10 - WORST POSSIBLE PAIN

## 2025-07-30 ASSESSMENT — PAIN DESCRIPTION - ORIENTATION
ORIENTATION: LEFT;MID
ORIENTATION: LEFT;UPPER

## 2025-07-30 ASSESSMENT — LIFESTYLE VARIABLES
HAVE YOU EVER FELT YOU SHOULD CUT DOWN ON YOUR DRINKING: NO
TOTAL SCORE: 0
EVER FELT BAD OR GUILTY ABOUT YOUR DRINKING: NO
HAVE PEOPLE ANNOYED YOU BY CRITICIZING YOUR DRINKING: NO
EVER HAD A DRINK FIRST THING IN THE MORNING TO STEADY YOUR NERVES TO GET RID OF A HANGOVER: NO

## 2025-07-30 ASSESSMENT — PAIN DESCRIPTION - LOCATION
LOCATION: BACK

## 2025-07-30 ASSESSMENT — PAIN DESCRIPTION - PAIN TYPE
TYPE: ACUTE PAIN
TYPE: ACUTE PAIN

## 2025-07-30 ASSESSMENT — PAIN - FUNCTIONAL ASSESSMENT
PAIN_FUNCTIONAL_ASSESSMENT: 0-10
PAIN_FUNCTIONAL_ASSESSMENT: 0-10

## 2025-07-30 ASSESSMENT — PAIN DESCRIPTION - FREQUENCY: FREQUENCY: CONSTANT/CONTINUOUS

## 2025-07-30 NOTE — ED PROVIDER NOTES
HPI   Chief Complaint   Patient presents with    Post-op Problem    Back Pain     Pt had cyst removed today. Site is now inflammed and leaking pus.       40-year-old male with history of MVR, hyperlipidemia, KATHRYN presenting to the ER today with pain and swelling to his surgical site on the left side of his back.  Patient tells me he had a cyst removed from the left side of his back yesterday.  There was no fall or injury but overnight he has developed increased pain and swelling to the area.  His surgical wound has not opened up and he has not had a fever.  He has not had any bleeding or discharge from the area.  He states it is very painful and he cannot touch his skin or lay flat on his back due to the location and size of the pain and swelling.  He is not on a blood thinner.  No further complaints.      History provided by:  Patient and spouse          Patient History   Medical History[1]  Surgical History[2]  Family History[3]  Social History[4]    Physical Exam   ED Triage Vitals [07/30/25 0141]   Temperature Heart Rate Respirations BP   36.4 °C (97.5 °F) 81 16 146/88      Pulse Ox Temp Source Heart Rate Source Patient Position   95 % Temporal Monitor Sitting      BP Location FiO2 (%)     Right arm --       Physical Exam    Eyes:      Conjunctiva/sclera: Conjunctivae normal.       Cardiovascular:      Rate and Rhythm: Normal rate and regular rhythm.      Pulses: Normal pulses.      Heart sounds: Normal heart sounds.   Pulmonary:      Effort: Pulmonary effort is normal.      Breath sounds: Normal breath sounds.     Musculoskeletal:      Comments: Normal gait. No thoracic spinal tenderness, step off or signs of bony trauma. Tenderness, edema to left mid thoracic paraspinal region surrounding surgical incision. No discharge or bleeding. No wound dehiscence, erythema or crepitus.     Skin:     General: Skin is warm.     Neurological:      Mental Status: He is alert.      Comments: Speech normal         ED Course &  MDM   Diagnoses as of 07/30/25 0554   Postoperative pain   Hematoma                 No data recorded     [place] Coma Scale Score: 15 (07/30/25 0144 : Hao Queen RN)                           Medical Decision Making  40-year-old male presenting to the ER today with increased pain and swelling to the surgical site on the left side of his back without any fall or injury.  Patient just had a cyst removed on his back yesterday and overnight he developed pain and swelling in the area.  Is not in a blood thinner.  No further complaints and he arrives afebrile with stable vital signs.  On exam heart RRR, lungs are clear.  He does not have any step-offs, midline tenderness over the spine or signs of bony trauma but in the left mid thoracic paraspinal region there is an area of edema, tenderness surrounding his surgical incision.  There is no erythema or crepitus, no wound dehiscence or discharge or bleeding.  4 mg IV morphine, 4 mg IV Zofran are ordered for pain relief as well as laboratory studies and imaging studies and he was seen and evaluated today by my attending.    CBC with stable hemoglobin and no leukocytosis.  CMP with sodium of 135 but no further electrolyte abnormality.  CT performed today shows a large hematoma in the subcutaneous soft tissues of the back left of midline measuring 5 x 8 x 9 cm with soft tissue edema and small amount of soft tissue emphysema.  Patient was requesting additional pain medication after CT, p.o. Percocet is ordered which is helping his pain.  There is no sign of infection on exam or laboratory studies.  I did discuss diagnosis of hematoma.  Patient is still endorsing pain to the area, will consult general surgery.    I did discuss the case with Dr. Thompson.  He indicates that the patient can be discharged home with pain medication at this time and he should call his surgeons office this morning to see if they can see him in the office today.  If they are unable to see him  he states to give the patient his number and he will see him in the office today for treatment of the hematoma.  He states the patient does not need to be hospitalized at this time.  When I discussed these results with patient and the family they were much agreeable with this plan.  Patient will be discharged home with p.o. Percocet, they already messaged his surgeons office and will try to contact them this morning but they have Dr. Thompson's number if they are unable to see him.  They expressed understanding and agreed with the plan of care today.      Labs Reviewed   CBC WITH AUTO DIFFERENTIAL - Abnormal       Result Value    WBC 9.3      nRBC 0.0      RBC 4.61      Hemoglobin 13.8      Hematocrit 39.3 (*)     MCV 85      MCH 29.9      MCHC 35.1      RDW 12.6      Platelets 183      Neutrophils % 56.2      Immature Granulocytes %, Automated 0.1      Lymphocytes % 33.9      Monocytes % 5.9      Eosinophils % 3.0      Basophils % 0.9      Neutrophils Absolute 5.19      Immature Granulocytes Absolute, Automated 0.01      Lymphocytes Absolute 3.14      Monocytes Absolute 0.55      Eosinophils Absolute 0.28      Basophils Absolute 0.08     COMPREHENSIVE METABOLIC PANEL - Abnormal    Glucose 88      Sodium 135 (*)     Potassium 3.9      Chloride 103      Bicarbonate 24      Anion Gap 12      Urea Nitrogen 18      Creatinine 1.09      eGFR 87      Calcium 9.0      Albumin 4.2      Alkaline Phosphatase 49      Total Protein 6.5      AST 16      Bilirubin, Total 0.4      ALT 11         CT angio chest for pulmonary embolism   Final Result   Large hematoma in the subcutaneous soft tissues of the back to the   left of midline measuring 5.0 x 8.0 x 9.0 cm in diameter with   surrounding soft tissue edema and small amount of soft tissue   emphysema.   Signed by Prasanna Flores            Procedure  Procedures       [1]   Past Medical History:  Diagnosis Date    Mitral valve prolapse     Sleep apnea    [2]   Past Surgical  History:  Procedure Laterality Date    CARDIAC CATHETERIZATION  10/2022   [3]   Family History  Problem Relation Name Age of Onset    Drug abuse Mother Mother 61    Alcohol abuse Father Father     COPD Father Father     Heart disease Father Father     Mitral valve prolapse Sister      Suicide Attempts Maternal Grandfather     [4]   Social History  Tobacco Use    Smoking status: Former     Current packs/day: 0.00     Types: Cigarettes     Quit date: 2010     Years since quitting: 15.5    Smokeless tobacco: Never   Vaping Use    Vaping status: Never Used   Substance Use Topics    Alcohol use: Not Currently    Drug use: Never        Anahi Kumar PA-C  07/30/25 0533

## 2025-07-30 NOTE — DISCHARGE INSTRUCTIONS
This morning call Dr. Sinha office for an appointment today, if unable to be seen by their office you can call Dr. Thompson for an appointment today.

## 2025-07-30 NOTE — PROGRESS NOTES
Patient seen in the office this morning for a wound check.  He had excision of back sebaceous cyst yesterday and had a post op hematoma form.  Patient's incision was intact with Dermabond over top however he had a large hematoma deep to the dermis.  I had to open the incision about 2 cm in order to evacuate the entire hematoma.  I irrigated with hydrogen peroxide to break up the blood clot followed by saline.  I gently packed  the wound to keep it open and draining and a pressure dressing was placed over top. He has dressing for home if it needs to be reapplied and he will come see me tomorrow in the clinic for another wound check.

## 2025-07-30 NOTE — TELEPHONE ENCOUNTER
Spoke to pt's wife on the phone when she called about concerns of pt's dressing on his back being saturated with blood.  I walked pt's wife nathalie through doing a dressing change while on the phone.  Nathalie did say pt slept for a few hours and woke up with the dressing as such and in pain again.  Pt states pain is not as bad as this morning upon arrival to the office but worse then it was when he left.  Pt's wife did apply a clean pressure dressing to site without difficulty or adverse reaction.  Pt's wife did send pictures through my chart messaging of the dressing.   Instructed that if the bleeding were to get worse and he is saturating a dressing in less then an hour that he should come to [facility] and notify Dr. Jordan.  If dressing is needed change every 6-8 hours then we will continue with plan of seeing him in the office tomorrow.   DR. Jordan aware and did view pictures in chart.  Pt's wife nathalie expresses verbal understanding and agrees with plan of care.

## 2025-07-31 ENCOUNTER — OFFICE VISIT (OUTPATIENT)
Dept: SURGERY | Facility: CLINIC | Age: 40
End: 2025-07-31
Payer: COMMERCIAL

## 2025-07-31 VITALS
RESPIRATION RATE: 16 BRPM | HEART RATE: 89 BPM | SYSTOLIC BLOOD PRESSURE: 117 MMHG | DIASTOLIC BLOOD PRESSURE: 75 MMHG | OXYGEN SATURATION: 95 % | TEMPERATURE: 98.1 F

## 2025-07-31 DIAGNOSIS — L72.3 SEBACEOUS CYST: Primary | ICD-10-CM

## 2025-07-31 PROCEDURE — 99024 POSTOP FOLLOW-UP VISIT: CPT | Performed by: SURGERY

## 2025-07-31 NOTE — PROGRESS NOTES
Patient here for a wound check.  He underwent excision of back sebaceous cyst on 7/29/2025, resulting in large hematoma that was evacuated yesterday in the office.  The last 24 hours he changed his dressing twice and today in the office the wound is dry no longer oozing.  Patient was given the option to go back to the OR for a washout and subsequent closure or to heal by secondary intention.  He would like to proceed with a more conservative treatment and healed secondarily.  This is reasonable.  He was given dressing supplies and appointment to see me on Monday.  We can also revisit the need for return to the OR on Monday.  Otherwise patient is well, vital signs stable and pain is well-controlled.    PE:  /75 (BP Location: Left arm, Patient Position: Sitting, BP Cuff Size: Large adult)   Pulse 89   Temp 36.7 °C (98.1 °F) (Temporal)   Resp 16   SpO2 95%   Gen: NAD  Skin: 2 cm incision, open with no layers of stitches.  Wound is clean there is no surrounding erythema or stigmata of infection.    Follow-up with me 8/4/2025.  Patient may call at anytime there are questions/concerns.

## 2025-08-04 ENCOUNTER — APPOINTMENT (OUTPATIENT)
Dept: SURGERY | Facility: CLINIC | Age: 40
End: 2025-08-04
Payer: COMMERCIAL

## 2025-08-04 VITALS
TEMPERATURE: 98 F | HEART RATE: 97 BPM | RESPIRATION RATE: 16 BRPM | SYSTOLIC BLOOD PRESSURE: 124 MMHG | DIASTOLIC BLOOD PRESSURE: 70 MMHG | OXYGEN SATURATION: 98 %

## 2025-08-04 DIAGNOSIS — G89.18 POST-OP PAIN: Primary | ICD-10-CM

## 2025-08-04 DIAGNOSIS — R22.2 MASS OF SUBCUTANEOUS TISSUE OF BACK: ICD-10-CM

## 2025-08-04 PROCEDURE — 99024 POSTOP FOLLOW-UP VISIT: CPT | Performed by: SURGERY

## 2025-08-04 NOTE — LETTER
August 4, 2025     Patient: Royer Acosta   YOB: 1985   Date of Visit: 8/4/2025       To Whom It May Concern:    It is my medical opinion that Royer Acosta may return to work on light duty 8/13/25.    If you have any questions or concerns, please don't hesitate to call.         Sincerely,        Shameka Jordan MD    CC: No Recipients

## 2025-08-04 NOTE — PROGRESS NOTES
Patient seen and examined for a wound check.  His wound is clean and hemostatic.  It is however quite large and will take a long time to heal by secondary intention.  I would like to get him set up with a wound VAC.    PE:  /70 (BP Location: Right arm, Patient Position: Sitting, BP Cuff Size: Adult)   Pulse 97   Temp 36.7 °C (98 °F) (Temporal)   Resp 16   SpO2 98%   Gen: NAD  Skin: 2 cm incision, open with no layers of stitches.  There is a periphery of the bout 4 more centimeters of undermining.  Wound is clean there is no surrounding erythema or stigmata of infection.    A/P:  40-year-old male who underwent excision of back sebaceous cyst on 7/29/2025 resulting large hematoma that was evacuated on 7/30/2025.  He now has to heal by secondary intention and has a wound measuring about 2 x 2 cm with a diameter of 4 cm of undermining.  I would like to set him up for wound VAC therapy.

## 2025-08-05 ENCOUNTER — TELEPHONE (OUTPATIENT)
Dept: HOME HEALTH SERVICES | Facility: HOME HEALTH | Age: 40
End: 2025-08-05
Payer: COMMERCIAL

## 2025-08-05 NOTE — TELEPHONE ENCOUNTER
Hi Dr. Del Castillo,    Thank you for referral. Has the wound vac been ordered for this patient? If so, please let me know which company is supplying it.    Thank you,    Intake

## 2025-08-06 ENCOUNTER — DOCUMENTATION (OUTPATIENT)
Dept: HOME HEALTH SERVICES | Facility: HOME HEALTH | Age: 40
End: 2025-08-06
Payer: COMMERCIAL

## 2025-08-06 NOTE — HH CARE COORDINATION
Home Care received a Referral for Nursing. We have processed the referral for a Start of Care within 48 hours of 08/07/2025.     If you have any questions or concerns, please feel free to contact us at [phone]. Follow the prompts, enter your five digit zip code, and you will be directed to your care team on [facility].

## 2025-08-07 ENCOUNTER — HOME CARE VISIT (OUTPATIENT)
Dept: HOME HEALTH SERVICES | Facility: HOME HEALTH | Age: 40
End: 2025-08-07
Payer: COMMERCIAL

## 2025-08-07 VITALS
RESPIRATION RATE: 20 BRPM | BODY MASS INDEX: 26.41 KG/M2 | WEIGHT: 195 LBS | HEIGHT: 72 IN | HEART RATE: 86 BPM | OXYGEN SATURATION: 98 % | SYSTOLIC BLOOD PRESSURE: 120 MMHG | DIASTOLIC BLOOD PRESSURE: 64 MMHG

## 2025-08-07 LAB
LABORATORY COMMENT REPORT: NORMAL
PATH REPORT.FINAL DX SPEC: NORMAL
PATH REPORT.GROSS SPEC: NORMAL
PATH REPORT.RELEVANT HX SPEC: NORMAL
PATH REPORT.TOTAL CANCER: NORMAL

## 2025-08-07 PROCEDURE — G0299 HHS/HOSPICE OF RN EA 15 MIN: HCPCS

## 2025-08-07 RX ORDER — OXYCODONE HYDROCHLORIDE 5 MG/1
5 TABLET ORAL EVERY 6 HOURS PRN
Qty: 10 TABLET | Refills: 0 | Status: SHIPPED | OUTPATIENT
Start: 2025-08-07

## 2025-08-07 ASSESSMENT — ENCOUNTER SYMPTOMS
PAIN SEVERITY GOAL: 0/10
LOWEST PAIN SEVERITY IN PAST 24 HOURS: 3/10
APPETITE LEVEL: GOOD
HIGHEST PAIN SEVERITY IN PAST 24 HOURS: 10/10
SUBJECTIVE PAIN PROGRESSION: WAXING AND WANING

## 2025-08-08 ASSESSMENT — ENCOUNTER SYMPTOMS
ANGER WITHIN DEFINED LIMITS: 1
LAST BOWEL MOVEMENT: 67424
SLEEP QUALITY: ADEQUATE
AGGRESSION WITHIN DEFINED LIMITS: 1

## 2025-08-08 ASSESSMENT — ACTIVITIES OF DAILY LIVING (ADL)
ENTERING_EXITING_HOME: MODERATE ASSIST
AMBULATION ASSISTANCE: 1
OASIS_M1830: 02
AMBULATION ASSISTANCE: INDEPENDENT

## 2025-08-09 ENCOUNTER — HOME CARE VISIT (OUTPATIENT)
Dept: HOME HEALTH SERVICES | Facility: HOME HEALTH | Age: 40
End: 2025-08-09
Payer: COMMERCIAL

## 2025-08-09 VITALS
RESPIRATION RATE: 20 BRPM | OXYGEN SATURATION: 97 % | TEMPERATURE: 99 F | DIASTOLIC BLOOD PRESSURE: 64 MMHG | HEART RATE: 71 BPM | SYSTOLIC BLOOD PRESSURE: 122 MMHG

## 2025-08-09 PROCEDURE — G0300 HHS/HOSPICE OF LPN EA 15 MIN: HCPCS

## 2025-08-09 SDOH — ECONOMIC STABILITY: GENERAL

## 2025-08-09 ASSESSMENT — ENCOUNTER SYMPTOMS
PAIN LOCATION - PAIN QUALITY: ACHING
LAST BOWEL MOVEMENT: 67426
APPETITE LEVEL: GOOD
PAIN SEVERITY GOAL: 2/10
PAIN LOCATION: BACK
PAIN: 1
HIGHEST PAIN SEVERITY IN PAST 24 HOURS: 8/10
PAIN LOCATION - PAIN SEVERITY: 3/10
PAIN LOCATION - RELIEVING FACTORS: PAIN MEDS
PAIN LOCATION - EXACERBATING FACTORS: MOVEMENT
LOWEST PAIN SEVERITY IN PAST 24 HOURS: 0/10
CHANGE IN APPETITE: UNCHANGED

## 2025-08-09 ASSESSMENT — ACTIVITIES OF DAILY LIVING (ADL): MONEY MANAGEMENT (EXPENSES/BILLS): INDEPENDENT

## 2025-08-11 ENCOUNTER — HOSPITAL ENCOUNTER (OUTPATIENT)
Dept: RADIOLOGY | Facility: CLINIC | Age: 40
End: 2025-08-11
Payer: COMMERCIAL

## 2025-08-11 ENCOUNTER — APPOINTMENT (OUTPATIENT)
Dept: SURGERY | Facility: CLINIC | Age: 40
End: 2025-08-11
Payer: COMMERCIAL

## 2025-08-11 ENCOUNTER — HOME CARE VISIT (OUTPATIENT)
Dept: HOME HEALTH SERVICES | Facility: HOME HEALTH | Age: 40
End: 2025-08-11
Payer: COMMERCIAL

## 2025-08-11 VITALS
DIASTOLIC BLOOD PRESSURE: 56 MMHG | OXYGEN SATURATION: 96 % | HEART RATE: 71 BPM | TEMPERATURE: 99.1 F | SYSTOLIC BLOOD PRESSURE: 132 MMHG

## 2025-08-11 VITALS
TEMPERATURE: 97.9 F | DIASTOLIC BLOOD PRESSURE: 77 MMHG | OXYGEN SATURATION: 100 % | RESPIRATION RATE: 16 BRPM | SYSTOLIC BLOOD PRESSURE: 131 MMHG | HEART RATE: 67 BPM

## 2025-08-11 DIAGNOSIS — I97.631: Primary | ICD-10-CM

## 2025-08-11 PROCEDURE — G0299 HHS/HOSPICE OF RN EA 15 MIN: HCPCS

## 2025-08-11 PROCEDURE — 99024 POSTOP FOLLOW-UP VISIT: CPT | Performed by: SURGERY

## 2025-08-11 SDOH — ECONOMIC STABILITY: GENERAL

## 2025-08-11 ASSESSMENT — ENCOUNTER SYMPTOMS
LOWEST PAIN SEVERITY IN PAST 24 HOURS: 4/10
SUBJECTIVE PAIN PROGRESSION: GRADUALLY IMPROVING
CHANGE IN APPETITE: INCREASED
HIGHEST PAIN SEVERITY IN PAST 24 HOURS: 5/10
APPETITE LEVEL: GOOD
LAST BOWEL MOVEMENT: 67428
PAIN SEVERITY GOAL: 0/10

## 2025-08-11 ASSESSMENT — ACTIVITIES OF DAILY LIVING (ADL)
AMBULATION ASSISTANCE: SUPERVISION
MONEY MANAGEMENT (EXPENSES/BILLS): INDEPENDENT
AMBULATION ASSISTANCE: 1

## 2025-08-13 ENCOUNTER — HOME CARE VISIT (OUTPATIENT)
Dept: HOME HEALTH SERVICES | Facility: HOME HEALTH | Age: 40
End: 2025-08-13
Payer: COMMERCIAL

## 2025-08-13 VITALS
OXYGEN SATURATION: 99 % | SYSTOLIC BLOOD PRESSURE: 122 MMHG | DIASTOLIC BLOOD PRESSURE: 76 MMHG | HEART RATE: 76 BPM | TEMPERATURE: 99.2 F | RESPIRATION RATE: 20 BRPM

## 2025-08-13 PROCEDURE — G0299 HHS/HOSPICE OF RN EA 15 MIN: HCPCS

## 2025-08-13 SDOH — ECONOMIC STABILITY: GENERAL

## 2025-08-13 ASSESSMENT — ENCOUNTER SYMPTOMS
HIGHEST PAIN SEVERITY IN PAST 24 HOURS: 6/10
APPETITE LEVEL: GOOD
SUBJECTIVE PAIN PROGRESSION: GRADUALLY IMPROVING
CHANGE IN APPETITE: UNCHANGED
LOWEST PAIN SEVERITY IN PAST 24 HOURS: 4/10
LIMITED RANGE OF MOTION: 1
PAIN SEVERITY GOAL: 0/10

## 2025-08-13 ASSESSMENT — ACTIVITIES OF DAILY LIVING (ADL)
PHYSICAL TRANSFERS ASSESSED: 1
CURRENT_FUNCTION: STAND BY ASSIST
AMBULATION ASSISTANCE: 1
MONEY MANAGEMENT (EXPENSES/BILLS): INDEPENDENT
AMBULATION ASSISTANCE: STAND BY ASSIST

## 2025-08-14 ENCOUNTER — APPOINTMENT (OUTPATIENT)
Dept: PRIMARY CARE | Facility: CLINIC | Age: 40
End: 2025-08-14
Payer: COMMERCIAL

## 2025-08-15 ENCOUNTER — HOME CARE VISIT (OUTPATIENT)
Dept: HOME HEALTH SERVICES | Facility: HOME HEALTH | Age: 40
End: 2025-08-15
Payer: COMMERCIAL

## 2025-08-15 VITALS
SYSTOLIC BLOOD PRESSURE: 129 MMHG | OXYGEN SATURATION: 97 % | TEMPERATURE: 98.2 F | RESPIRATION RATE: 18 BRPM | HEART RATE: 72 BPM | DIASTOLIC BLOOD PRESSURE: 71 MMHG

## 2025-08-15 PROCEDURE — G0299 HHS/HOSPICE OF RN EA 15 MIN: HCPCS

## 2025-08-15 ASSESSMENT — ENCOUNTER SYMPTOMS
PAIN: 1
PAIN LOCATION - PAIN SEVERITY: 2/10
PAIN LOCATION: BACK
CHANGE IN APPETITE: UNCHANGED
APPETITE LEVEL: GOOD
PERSON REPORTING PAIN: PATIENT

## 2025-08-18 ENCOUNTER — HOME CARE VISIT (OUTPATIENT)
Dept: HOME HEALTH SERVICES | Facility: HOME HEALTH | Age: 40
End: 2025-08-18
Payer: COMMERCIAL

## 2025-08-18 ENCOUNTER — APPOINTMENT (OUTPATIENT)
Dept: SURGERY | Facility: CLINIC | Age: 40
End: 2025-08-18
Payer: COMMERCIAL

## 2025-08-18 VITALS
TEMPERATURE: 98.4 F | HEART RATE: 74 BPM | DIASTOLIC BLOOD PRESSURE: 76 MMHG | OXYGEN SATURATION: 98 % | SYSTOLIC BLOOD PRESSURE: 131 MMHG | RESPIRATION RATE: 16 BRPM

## 2025-08-18 DIAGNOSIS — I97.631: Primary | ICD-10-CM

## 2025-08-18 PROCEDURE — 99024 POSTOP FOLLOW-UP VISIT: CPT | Performed by: SURGERY

## 2025-08-20 ENCOUNTER — HOME CARE VISIT (OUTPATIENT)
Dept: HOME HEALTH SERVICES | Facility: HOME HEALTH | Age: 40
End: 2025-08-20
Payer: COMMERCIAL

## 2025-08-20 VITALS
HEART RATE: 79 BPM | TEMPERATURE: 98.7 F | OXYGEN SATURATION: 96 % | RESPIRATION RATE: 18 BRPM | DIASTOLIC BLOOD PRESSURE: 65 MMHG | SYSTOLIC BLOOD PRESSURE: 122 MMHG

## 2025-08-20 PROCEDURE — G0299 HHS/HOSPICE OF RN EA 15 MIN: HCPCS

## 2025-08-20 ASSESSMENT — ENCOUNTER SYMPTOMS
PERSON REPORTING PAIN: PATIENT
CHANGE IN APPETITE: UNCHANGED
APPETITE LEVEL: GOOD
PAIN LOCATION - PAIN SEVERITY: 2/10
PAIN LOCATION: BACK
PAIN: 1

## 2025-08-21 ENCOUNTER — DOCUMENTATION (OUTPATIENT)
Dept: SURGERY | Facility: CLINIC | Age: 40
End: 2025-08-21
Payer: COMMERCIAL

## 2025-08-22 ENCOUNTER — HOME CARE VISIT (OUTPATIENT)
Dept: HOME HEALTH SERVICES | Facility: HOME HEALTH | Age: 40
End: 2025-08-22
Payer: COMMERCIAL

## 2025-08-22 VITALS
OXYGEN SATURATION: 96 % | HEART RATE: 80 BPM | RESPIRATION RATE: 20 BRPM | SYSTOLIC BLOOD PRESSURE: 112 MMHG | TEMPERATURE: 99.1 F | DIASTOLIC BLOOD PRESSURE: 60 MMHG

## 2025-08-22 PROCEDURE — G0299 HHS/HOSPICE OF RN EA 15 MIN: HCPCS

## 2025-08-22 SDOH — ECONOMIC STABILITY: GENERAL

## 2025-08-22 ASSESSMENT — ACTIVITIES OF DAILY LIVING (ADL)
AMBULATION ASSISTANCE: INDEPENDENT
MONEY MANAGEMENT (EXPENSES/BILLS): INDEPENDENT
AMBULATION ASSISTANCE: 1

## 2025-08-22 ASSESSMENT — ENCOUNTER SYMPTOMS
LAST BOWEL MOVEMENT: 67439
SUBJECTIVE PAIN PROGRESSION: GRADUALLY IMPROVING
APPETITE LEVEL: GOOD
CHANGE IN APPETITE: UNCHANGED
LOWEST PAIN SEVERITY IN PAST 24 HOURS: 0/10
HIGHEST PAIN SEVERITY IN PAST 24 HOURS: 5/10
PAIN SEVERITY GOAL: 0/10

## 2025-08-24 ENCOUNTER — HOME CARE VISIT (OUTPATIENT)
Dept: HOME HEALTH SERVICES | Facility: HOME HEALTH | Age: 40
End: 2025-08-24
Payer: COMMERCIAL

## 2025-08-24 VITALS
OXYGEN SATURATION: 98 % | HEART RATE: 82 BPM | DIASTOLIC BLOOD PRESSURE: 62 MMHG | SYSTOLIC BLOOD PRESSURE: 118 MMHG | TEMPERATURE: 97.4 F | RESPIRATION RATE: 18 BRPM

## 2025-08-24 PROCEDURE — G0299 HHS/HOSPICE OF RN EA 15 MIN: HCPCS

## 2025-08-24 ASSESSMENT — ENCOUNTER SYMPTOMS
DENIES PAIN: 1
CHANGE IN APPETITE: UNCHANGED
APPETITE LEVEL: GOOD

## 2025-08-27 ENCOUNTER — HOME CARE VISIT (OUTPATIENT)
Dept: HOME HEALTH SERVICES | Facility: HOME HEALTH | Age: 40
End: 2025-08-27
Payer: COMMERCIAL

## 2025-08-27 VITALS
DIASTOLIC BLOOD PRESSURE: 68 MMHG | SYSTOLIC BLOOD PRESSURE: 120 MMHG | OXYGEN SATURATION: 98 % | TEMPERATURE: 97.5 F | HEART RATE: 76 BPM

## 2025-08-27 PROCEDURE — G0300 HHS/HOSPICE OF LPN EA 15 MIN: HCPCS

## 2025-08-28 ASSESSMENT — ENCOUNTER SYMPTOMS
DENIES PAIN: 1
APPETITE LEVEL: GOOD
CHANGE IN APPETITE: UNCHANGED
PERSON REPORTING PAIN: PATIENT

## 2025-08-28 ASSESSMENT — ACTIVITIES OF DAILY LIVING (ADL)
BATHING_CURRENT_FUNCTION: INDEPENDENT
FEEDING: INDEPENDENT
DRESSING_UB_CURRENT_FUNCTION: INDEPENDENT
FEEDING ASSESSED: 1
BATHING ASSESSED: 1
TOILETING: INDEPENDENT
AMBULATION ASSISTANCE: 1
DRESSING_LB_CURRENT_FUNCTION: INDEPENDENT
TOILETING: 1
AMBULATION ASSISTANCE: INDEPENDENT

## 2025-08-29 ENCOUNTER — HOME CARE VISIT (OUTPATIENT)
Dept: HOME HEALTH SERVICES | Facility: HOME HEALTH | Age: 40
End: 2025-08-29
Payer: COMMERCIAL

## 2025-08-29 VITALS
TEMPERATURE: 98.7 F | DIASTOLIC BLOOD PRESSURE: 70 MMHG | OXYGEN SATURATION: 98 % | HEART RATE: 74 BPM | SYSTOLIC BLOOD PRESSURE: 130 MMHG | RESPIRATION RATE: 20 BRPM

## 2025-08-29 PROCEDURE — G0299 HHS/HOSPICE OF RN EA 15 MIN: HCPCS

## 2025-08-30 ASSESSMENT — ENCOUNTER SYMPTOMS
SUBJECTIVE PAIN PROGRESSION: GRADUALLY IMPROVING
LAST BOWEL MOVEMENT: 67446
PAIN SEVERITY GOAL: 0/10
APPETITE LEVEL: GOOD
HIGHEST PAIN SEVERITY IN PAST 24 HOURS: 5/10
CHANGE IN APPETITE: UNCHANGED
LOWEST PAIN SEVERITY IN PAST 24 HOURS: 3/10

## 2025-08-30 ASSESSMENT — ACTIVITIES OF DAILY LIVING (ADL)
AMBULATION ASSISTANCE: 1
AMBULATION ASSISTANCE: INDEPENDENT

## 2025-08-31 ENCOUNTER — HOME CARE VISIT (OUTPATIENT)
Dept: HOME HEALTH SERVICES | Facility: HOME HEALTH | Age: 40
End: 2025-08-31
Payer: COMMERCIAL

## 2025-08-31 ASSESSMENT — ENCOUNTER SYMPTOMS
LOWEST PAIN SEVERITY IN PAST 24 HOURS: 3/10
SUBJECTIVE PAIN PROGRESSION: UNCHANGED
HIGHEST PAIN SEVERITY IN PAST 24 HOURS: 5/10
FATIGUES EASILY: 1
PAIN SEVERITY GOAL: 0/10
PAIN LOCATION - PAIN SEVERITY: 4/10
MUSCLE WEAKNESS: 1
FATIGUE: 1
PAIN LOCATION - PAIN QUALITY: ACHE, PRESSURE
PAIN LOCATION - PAIN FREQUENCY: CONSTANT
PERSON REPORTING PAIN: PATIENT
PAIN LOCATION: BACK
LAST BOWEL MOVEMENT: 67448
PAIN: 1
APPETITE LEVEL: GOOD
CHANGE IN APPETITE: UNCHANGED

## 2025-09-03 ENCOUNTER — HOME CARE VISIT (OUTPATIENT)
Dept: HOME HEALTH SERVICES | Facility: HOME HEALTH | Age: 40
End: 2025-09-03
Payer: COMMERCIAL

## 2025-09-03 PROCEDURE — G0300 HHS/HOSPICE OF LPN EA 15 MIN: HCPCS

## 2025-09-03 ASSESSMENT — ENCOUNTER SYMPTOMS
PERSON REPORTING PAIN: PATIENT
DENIES PAIN: 1

## 2025-09-05 ENCOUNTER — HOME CARE VISIT (OUTPATIENT)
Dept: HOME HEALTH SERVICES | Facility: HOME HEALTH | Age: 40
End: 2025-09-05
Payer: COMMERCIAL

## 2025-09-05 VITALS
TEMPERATURE: 97.1 F | HEART RATE: 79 BPM | DIASTOLIC BLOOD PRESSURE: 70 MMHG | SYSTOLIC BLOOD PRESSURE: 118 MMHG | OXYGEN SATURATION: 98 %

## 2025-09-05 PROCEDURE — G0300 HHS/HOSPICE OF LPN EA 15 MIN: HCPCS

## 2026-01-19 ENCOUNTER — APPOINTMENT (OUTPATIENT)
Dept: CARDIOLOGY | Facility: CLINIC | Age: 41
End: 2026-01-19
Payer: COMMERCIAL

## (undated) DEVICE — ADHESIVE, SKIN, DERMABOND ADVANCED, 15CM, PEN-STYLE

## (undated) DEVICE — SUTURE, CTD, VICRYL, 2-0, UND, BR, CT-2

## (undated) DEVICE — DRAPE PACK, MAJOR, OPTIMA, PEDIATRIC, 77 X 108 IN, DISPOSABLE, LF, STERILE

## (undated) DEVICE — DRESSING, NON-ADHERENT, TELFA, 3 X 8 IN, NS

## (undated) DEVICE — APPLICATOR, PREP, CHLORAPREP, W/ORANGE TINT, 10.5ML

## (undated) DEVICE — NEEDLE, HYPODERMIC, 25 G X 1.5 IN, A BEVEL, STERILE

## (undated) DEVICE — PACK, BASIC

## (undated) DEVICE — SUTURE, VICRYL, 3-0, 27 IN, SH

## (undated) DEVICE — GLOVE, SURGICAL, PROTEXIS PI , 6.0, PF, LF

## (undated) DEVICE — CAUTERY, PENCIL, PUSH BUTTON, SMOKE EVAC, 70MM

## (undated) DEVICE — Device

## (undated) DEVICE — DRESSING, GAUZE, 16 PLY, 4 X 4 IN, STERILE

## (undated) DEVICE — SUTURE, MONOCRYL PLUS 4-0, PS-2, 18IN, UNDYED

## (undated) DEVICE — TIP, SUCTION, FRAZIER, 8 FR